# Patient Record
Sex: MALE | Race: WHITE | NOT HISPANIC OR LATINO | Employment: OTHER | URBAN - METROPOLITAN AREA
[De-identification: names, ages, dates, MRNs, and addresses within clinical notes are randomized per-mention and may not be internally consistent; named-entity substitution may affect disease eponyms.]

---

## 2021-10-02 ENCOUNTER — APPOINTMENT (EMERGENCY)
Dept: RADIOLOGY | Facility: HOSPITAL | Age: 66
End: 2021-10-02
Payer: MEDICARE

## 2021-10-02 ENCOUNTER — HOSPITAL ENCOUNTER (EMERGENCY)
Facility: HOSPITAL | Age: 66
Discharge: HOME | End: 2021-10-03
Attending: EMERGENCY MEDICINE
Payer: MEDICARE

## 2021-10-02 DIAGNOSIS — J18.9 PNEUMONIA: Primary | ICD-10-CM

## 2021-10-02 LAB
ALBUMIN SERPL BCP-MCNC: 3.7 G/DL (ref 3.5–5)
ALP SERPL-CCNC: 83 U/L (ref 46–116)
ALT SERPL W P-5'-P-CCNC: 25 U/L (ref 12–78)
ANION GAP SERPL CALCULATED.3IONS-SCNC: 9 MMOL/L (ref 4–13)
AST SERPL W P-5'-P-CCNC: 18 U/L (ref 5–45)
BACTERIA UR QL AUTO: ABNORMAL /HPF
BASOPHILS # BLD AUTO: 0.04 THOUSANDS/ΜL (ref 0–0.1)
BASOPHILS NFR BLD AUTO: 0 % (ref 0–1)
BILIRUB DIRECT SERPL-MCNC: 0.41 MG/DL (ref 0–0.2)
BILIRUB SERPL-MCNC: 1.23 MG/DL (ref 0.2–1)
BILIRUB UR QL STRIP: ABNORMAL
BUN SERPL-MCNC: 15 MG/DL (ref 5–25)
CALCIUM SERPL-MCNC: 8.9 MG/DL (ref 8.3–10.1)
CHLORIDE SERPL-SCNC: 101 MMOL/L (ref 100–108)
CLARITY UR: CLEAR
CO2 SERPL-SCNC: 29 MMOL/L (ref 21–32)
COLOR UR: ABNORMAL
CREAT SERPL-MCNC: 1.05 MG/DL (ref 0.6–1.3)
EOSINOPHIL # BLD AUTO: 0.03 THOUSAND/ΜL (ref 0–0.61)
EOSINOPHIL NFR BLD AUTO: 0 % (ref 0–6)
ERYTHROCYTE [DISTWIDTH] IN BLOOD BY AUTOMATED COUNT: 13 % (ref 11.6–15.1)
GFR SERPL CREATININE-BSD FRML MDRD: 74 ML/MIN/1.73SQ M
GLUCOSE SERPL-MCNC: 116 MG/DL (ref 65–140)
GLUCOSE UR STRIP-MCNC: NEGATIVE MG/DL
HCT VFR BLD AUTO: 46.8 % (ref 36.5–49.3)
HGB BLD-MCNC: 15.3 G/DL (ref 12–17)
HGB UR QL STRIP.AUTO: ABNORMAL
IMM GRANULOCYTES # BLD AUTO: 0.04 THOUSAND/UL (ref 0–0.2)
IMM GRANULOCYTES NFR BLD AUTO: 0 % (ref 0–2)
KETONES UR STRIP-MCNC: ABNORMAL MG/DL
LEUKOCYTE ESTERASE UR QL STRIP: NEGATIVE
LIPASE SERPL-CCNC: 62 U/L (ref 73–393)
LYMPHOCYTES # BLD AUTO: 0.96 THOUSANDS/ΜL (ref 0.6–4.47)
LYMPHOCYTES NFR BLD AUTO: 9 % (ref 14–44)
MCH RBC QN AUTO: 30.6 PG (ref 26.8–34.3)
MCHC RBC AUTO-ENTMCNC: 32.7 G/DL (ref 31.4–37.4)
MCV RBC AUTO: 94 FL (ref 82–98)
MONOCYTES # BLD AUTO: 0.94 THOUSAND/ΜL (ref 0.17–1.22)
MONOCYTES NFR BLD AUTO: 9 % (ref 4–12)
NEUTROPHILS # BLD AUTO: 8.56 THOUSANDS/ΜL (ref 1.85–7.62)
NEUTS SEG NFR BLD AUTO: 82 % (ref 43–75)
NITRITE UR QL STRIP: NEGATIVE
NON-SQ EPI CELLS URNS QL MICRO: ABNORMAL /HPF
NRBC BLD AUTO-RTO: 0 /100 WBCS
PH UR STRIP.AUTO: 5 [PH]
PLATELET # BLD AUTO: 196 THOUSANDS/UL (ref 149–390)
PMV BLD AUTO: 8.8 FL (ref 8.9–12.7)
POTASSIUM SERPL-SCNC: 4.7 MMOL/L (ref 3.5–5.3)
PROT SERPL-MCNC: 7.8 G/DL (ref 6.4–8.2)
PROT UR STRIP-MCNC: ABNORMAL MG/DL
RBC # BLD AUTO: 5 MILLION/UL (ref 3.88–5.62)
RBC #/AREA URNS AUTO: ABNORMAL /HPF
SODIUM SERPL-SCNC: 139 MMOL/L (ref 136–145)
SP GR UR STRIP.AUTO: 1.02 (ref 1–1.03)
UROBILINOGEN UR QL STRIP.AUTO: 4 E.U./DL
WBC # BLD AUTO: 10.57 THOUSAND/UL (ref 4.31–10.16)
WBC #/AREA URNS AUTO: ABNORMAL /HPF

## 2021-10-02 PROCEDURE — 74177 CT ABD & PELVIS W/CONTRAST: CPT

## 2021-10-02 PROCEDURE — 83690 ASSAY OF LIPASE: CPT | Performed by: EMERGENCY MEDICINE

## 2021-10-02 PROCEDURE — 96375 TX/PRO/DX INJ NEW DRUG ADDON: CPT

## 2021-10-02 PROCEDURE — 82248 BILIRUBIN DIRECT: CPT | Performed by: EMERGENCY MEDICINE

## 2021-10-02 PROCEDURE — 96374 THER/PROPH/DIAG INJ IV PUSH: CPT

## 2021-10-02 PROCEDURE — 36415 COLL VENOUS BLD VENIPUNCTURE: CPT | Performed by: EMERGENCY MEDICINE

## 2021-10-02 PROCEDURE — 96361 HYDRATE IV INFUSION ADD-ON: CPT

## 2021-10-02 PROCEDURE — 80053 COMPREHEN METABOLIC PANEL: CPT | Performed by: EMERGENCY MEDICINE

## 2021-10-02 PROCEDURE — 99285 EMERGENCY DEPT VISIT HI MDM: CPT | Performed by: EMERGENCY MEDICINE

## 2021-10-02 PROCEDURE — 85025 COMPLETE CBC W/AUTO DIFF WBC: CPT | Performed by: EMERGENCY MEDICINE

## 2021-10-02 PROCEDURE — 99284 EMERGENCY DEPT VISIT MOD MDM: CPT

## 2021-10-02 PROCEDURE — 81001 URINALYSIS AUTO W/SCOPE: CPT | Performed by: EMERGENCY MEDICINE

## 2021-10-02 RX ORDER — AMOXICILLIN 500 MG/1
1000 CAPSULE ORAL EVERY 8 HOURS SCHEDULED
Qty: 42 CAPSULE | Refills: 0 | Status: SHIPPED | OUTPATIENT
Start: 2021-10-02 | End: 2021-10-09

## 2021-10-02 RX ORDER — ONDANSETRON 2 MG/ML
4 INJECTION INTRAMUSCULAR; INTRAVENOUS ONCE
Status: COMPLETED | OUTPATIENT
Start: 2021-10-02 | End: 2021-10-02

## 2021-10-02 RX ORDER — HYDROMORPHONE HCL/PF 1 MG/ML
1 SYRINGE (ML) INJECTION ONCE
Status: COMPLETED | OUTPATIENT
Start: 2021-10-02 | End: 2021-10-02

## 2021-10-02 RX ORDER — AMOXICILLIN 250 MG/1
1000 CAPSULE ORAL ONCE
Status: COMPLETED | OUTPATIENT
Start: 2021-10-03 | End: 2021-10-03

## 2021-10-02 RX ADMIN — SODIUM CHLORIDE 1000 ML: 0.9 INJECTION, SOLUTION INTRAVENOUS at 21:22

## 2021-10-02 RX ADMIN — ONDANSETRON 4 MG: 2 INJECTION INTRAMUSCULAR; INTRAVENOUS at 21:26

## 2021-10-02 RX ADMIN — HYDROMORPHONE HYDROCHLORIDE 1 MG: 1 INJECTION, SOLUTION INTRAMUSCULAR; INTRAVENOUS; SUBCUTANEOUS at 21:26

## 2021-10-02 RX ADMIN — IOHEXOL 100 ML: 350 INJECTION, SOLUTION INTRAVENOUS at 22:15

## 2021-10-03 VITALS
WEIGHT: 246 LBS | TEMPERATURE: 99.9 F | RESPIRATION RATE: 20 BRPM | DIASTOLIC BLOOD PRESSURE: 85 MMHG | SYSTOLIC BLOOD PRESSURE: 148 MMHG | OXYGEN SATURATION: 96 % | HEART RATE: 96 BPM

## 2021-10-03 RX ADMIN — AMOXICILLIN 1000 MG: 250 CAPSULE ORAL at 00:01

## 2021-11-15 ENCOUNTER — APPOINTMENT (EMERGENCY)
Dept: RADIOLOGY | Facility: HOSPITAL | Age: 66
DRG: 299 | End: 2021-11-15
Payer: MEDICARE

## 2021-11-15 ENCOUNTER — OFFICE VISIT (OUTPATIENT)
Dept: URGENT CARE | Facility: CLINIC | Age: 66
End: 2021-11-15
Payer: MEDICARE

## 2021-11-15 ENCOUNTER — HOSPITAL ENCOUNTER (INPATIENT)
Facility: HOSPITAL | Age: 66
LOS: 1 days | Discharge: HOME | DRG: 299 | End: 2021-11-16
Attending: EMERGENCY MEDICINE | Admitting: INTERNAL MEDICINE
Payer: MEDICARE

## 2021-11-15 VITALS
HEIGHT: 71 IN | DIASTOLIC BLOOD PRESSURE: 90 MMHG | HEART RATE: 79 BPM | RESPIRATION RATE: 18 BRPM | SYSTOLIC BLOOD PRESSURE: 150 MMHG | WEIGHT: 258.6 LBS | TEMPERATURE: 98.2 F | OXYGEN SATURATION: 99 % | BODY MASS INDEX: 36.2 KG/M2

## 2021-11-15 DIAGNOSIS — M79.604 RIGHT LEG PAIN: ICD-10-CM

## 2021-11-15 DIAGNOSIS — I26.99 PULMONARY EMBOLISM (HCC): Primary | ICD-10-CM

## 2021-11-15 DIAGNOSIS — I82.401 ACUTE DEEP VEIN THROMBOSIS (DVT) OF RIGHT LOWER EXTREMITY (HCC): ICD-10-CM

## 2021-11-15 DIAGNOSIS — M79.604 PAIN AND SWELLING OF RIGHT LOWER EXTREMITY: Primary | ICD-10-CM

## 2021-11-15 DIAGNOSIS — M79.89 PAIN AND SWELLING OF RIGHT LOWER EXTREMITY: Primary | ICD-10-CM

## 2021-11-15 PROBLEM — J18.1 LEFT LOWER LOBE CONSOLIDATION (HCC): Status: ACTIVE | Noted: 2021-11-15

## 2021-11-15 PROBLEM — I82.461 ACUTE DEEP VEIN THROMBOSIS (DVT) OF CALF MUSCLE VEIN OF RIGHT LOWER EXTREMITY (HCC): Status: ACTIVE | Noted: 2021-11-15

## 2021-11-15 PROBLEM — R59.1 LYMPHADENOPATHY: Status: ACTIVE | Noted: 2021-11-15

## 2021-11-15 LAB
2HR DELTA HS TROPONIN: 1 NG/L
4HR DELTA HS TROPONIN: 0 NG/L
ANION GAP SERPL CALCULATED.3IONS-SCNC: 8 MMOL/L (ref 4–13)
APTT PPP: 27 SECONDS (ref 23–37)
BACTERIA UR QL AUTO: NORMAL /HPF
BASOPHILS # BLD AUTO: 0.07 THOUSANDS/ΜL (ref 0–0.1)
BASOPHILS NFR BLD AUTO: 1 % (ref 0–1)
BILIRUB UR QL STRIP: NEGATIVE
BUN SERPL-MCNC: 10 MG/DL (ref 5–25)
CALCIUM SERPL-MCNC: 8.5 MG/DL (ref 8.3–10.1)
CARDIAC TROPONIN I PNL SERPL HS: 2 NG/L
CARDIAC TROPONIN I PNL SERPL HS: 2 NG/L
CARDIAC TROPONIN I PNL SERPL HS: 3 NG/L
CHLORIDE SERPL-SCNC: 106 MMOL/L (ref 100–108)
CLARITY UR: CLEAR
CO2 SERPL-SCNC: 28 MMOL/L (ref 21–32)
COLOR UR: ABNORMAL
CREAT SERPL-MCNC: 0.83 MG/DL (ref 0.6–1.3)
EOSINOPHIL # BLD AUTO: 0.57 THOUSAND/ΜL (ref 0–0.61)
EOSINOPHIL NFR BLD AUTO: 8 % (ref 0–6)
ERYTHROCYTE [DISTWIDTH] IN BLOOD BY AUTOMATED COUNT: 12.8 % (ref 11.6–15.1)
FLUAV RNA RESP QL NAA+PROBE: NEGATIVE
FLUBV RNA RESP QL NAA+PROBE: NEGATIVE
GFR SERPL CREATININE-BSD FRML MDRD: 92 ML/MIN/1.73SQ M
GLUCOSE SERPL-MCNC: 90 MG/DL (ref 65–140)
GLUCOSE UR STRIP-MCNC: NEGATIVE MG/DL
HCT VFR BLD AUTO: 43.6 % (ref 36.5–49.3)
HGB BLD-MCNC: 14.4 G/DL (ref 12–17)
HGB UR QL STRIP.AUTO: ABNORMAL
IMM GRANULOCYTES # BLD AUTO: 0.02 THOUSAND/UL (ref 0–0.2)
IMM GRANULOCYTES NFR BLD AUTO: 0 % (ref 0–2)
INR PPP: 1.04 (ref 0.84–1.19)
KETONES UR STRIP-MCNC: NEGATIVE MG/DL
LEUKOCYTE ESTERASE UR QL STRIP: NEGATIVE
LYMPHOCYTES # BLD AUTO: 1.37 THOUSANDS/ΜL (ref 0.6–4.47)
LYMPHOCYTES NFR BLD AUTO: 20 % (ref 14–44)
MAGNESIUM SERPL-MCNC: 2.2 MG/DL (ref 1.6–2.6)
MCH RBC QN AUTO: 30.7 PG (ref 26.8–34.3)
MCHC RBC AUTO-ENTMCNC: 33 G/DL (ref 31.4–37.4)
MCV RBC AUTO: 93 FL (ref 82–98)
MONOCYTES # BLD AUTO: 0.44 THOUSAND/ΜL (ref 0.17–1.22)
MONOCYTES NFR BLD AUTO: 6 % (ref 4–12)
NEUTROPHILS # BLD AUTO: 4.49 THOUSANDS/ΜL (ref 1.85–7.62)
NEUTS SEG NFR BLD AUTO: 65 % (ref 43–75)
NITRITE UR QL STRIP: NEGATIVE
NON-SQ EPI CELLS URNS QL MICRO: NORMAL /HPF
NRBC BLD AUTO-RTO: 0 /100 WBCS
PH UR STRIP.AUTO: 5 [PH]
PLATELET # BLD AUTO: 205 THOUSANDS/UL (ref 149–390)
PMV BLD AUTO: 8.7 FL (ref 8.9–12.7)
POTASSIUM SERPL-SCNC: 3.9 MMOL/L (ref 3.5–5.3)
PROT UR STRIP-MCNC: NEGATIVE MG/DL
PROTHROMBIN TIME: 13.4 SECONDS (ref 11.6–14.5)
RBC # BLD AUTO: 4.69 MILLION/UL (ref 3.88–5.62)
RBC #/AREA URNS AUTO: NORMAL /HPF
RSV RNA RESP QL NAA+PROBE: NEGATIVE
SARS-COV-2 RNA RESP QL NAA+PROBE: NEGATIVE
SODIUM SERPL-SCNC: 142 MMOL/L (ref 136–145)
SP GR UR STRIP.AUTO: 1.01 (ref 1–1.03)
UROBILINOGEN UR QL STRIP.AUTO: 0.2 E.U./DL
WBC # BLD AUTO: 6.96 THOUSAND/UL (ref 4.31–10.16)
WBC #/AREA URNS AUTO: NORMAL /HPF

## 2021-11-15 PROCEDURE — 99285 EMERGENCY DEPT VISIT HI MDM: CPT | Performed by: EMERGENCY MEDICINE

## 2021-11-15 PROCEDURE — 80048 BASIC METABOLIC PNL TOTAL CA: CPT | Performed by: EMERGENCY MEDICINE

## 2021-11-15 PROCEDURE — 36415 COLL VENOUS BLD VENIPUNCTURE: CPT | Performed by: EMERGENCY MEDICINE

## 2021-11-15 PROCEDURE — 83735 ASSAY OF MAGNESIUM: CPT | Performed by: EMERGENCY MEDICINE

## 2021-11-15 PROCEDURE — 99285 EMERGENCY DEPT VISIT HI MDM: CPT

## 2021-11-15 PROCEDURE — 85730 THROMBOPLASTIN TIME PARTIAL: CPT | Performed by: EMERGENCY MEDICINE

## 2021-11-15 PROCEDURE — 71275 CT ANGIOGRAPHY CHEST: CPT

## 2021-11-15 PROCEDURE — 93971 EXTREMITY STUDY: CPT | Performed by: SURGERY

## 2021-11-15 PROCEDURE — 0241U HB NFCT DS VIR RESP RNA 4 TRGT: CPT | Performed by: NURSE PRACTITIONER

## 2021-11-15 PROCEDURE — 93005 ELECTROCARDIOGRAM TRACING: CPT

## 2021-11-15 PROCEDURE — 99223 1ST HOSP IP/OBS HIGH 75: CPT | Performed by: INTERNAL MEDICINE

## 2021-11-15 PROCEDURE — 84484 ASSAY OF TROPONIN QUANT: CPT | Performed by: EMERGENCY MEDICINE

## 2021-11-15 PROCEDURE — 93971 EXTREMITY STUDY: CPT

## 2021-11-15 PROCEDURE — 84484 ASSAY OF TROPONIN QUANT: CPT | Performed by: NURSE PRACTITIONER

## 2021-11-15 PROCEDURE — 73590 X-RAY EXAM OF LOWER LEG: CPT

## 2021-11-15 PROCEDURE — 1124F ACP DISCUSS-NO DSCNMKR DOCD: CPT | Performed by: EMERGENCY MEDICINE

## 2021-11-15 PROCEDURE — 85025 COMPLETE CBC W/AUTO DIFF WBC: CPT | Performed by: EMERGENCY MEDICINE

## 2021-11-15 PROCEDURE — 99213 OFFICE O/P EST LOW 20 MIN: CPT | Performed by: PHYSICIAN ASSISTANT

## 2021-11-15 PROCEDURE — 81001 URINALYSIS AUTO W/SCOPE: CPT | Performed by: EMERGENCY MEDICINE

## 2021-11-15 PROCEDURE — G1004 CDSM NDSC: HCPCS

## 2021-11-15 PROCEDURE — 85610 PROTHROMBIN TIME: CPT | Performed by: EMERGENCY MEDICINE

## 2021-11-15 RX ORDER — POLYETHYLENE GLYCOL 3350 17 G/17G
17 POWDER, FOR SOLUTION ORAL DAILY PRN
Status: DISCONTINUED | OUTPATIENT
Start: 2021-11-15 | End: 2021-11-16 | Stop reason: HOSPADM

## 2021-11-15 RX ORDER — ACETAMINOPHEN 325 MG/1
650 TABLET ORAL EVERY 6 HOURS PRN
Status: DISCONTINUED | OUTPATIENT
Start: 2021-11-15 | End: 2021-11-16 | Stop reason: HOSPADM

## 2021-11-15 RX ORDER — CALCIUM CARBONATE 200(500)MG
1000 TABLET,CHEWABLE ORAL DAILY PRN
Status: DISCONTINUED | OUTPATIENT
Start: 2021-11-15 | End: 2021-11-16 | Stop reason: HOSPADM

## 2021-11-15 RX ORDER — ONDANSETRON 2 MG/ML
4 INJECTION INTRAMUSCULAR; INTRAVENOUS EVERY 6 HOURS PRN
Status: DISCONTINUED | OUTPATIENT
Start: 2021-11-15 | End: 2021-11-16 | Stop reason: HOSPADM

## 2021-11-15 RX ADMIN — IOHEXOL 85 ML: 350 INJECTION, SOLUTION INTRAVENOUS at 16:48

## 2021-11-15 RX ADMIN — ENOXAPARIN SODIUM 120 MG: 120 INJECTION SUBCUTANEOUS at 17:44

## 2021-11-16 ENCOUNTER — APPOINTMENT (INPATIENT)
Dept: NON INVASIVE DIAGNOSTICS | Facility: HOSPITAL | Age: 66
DRG: 299 | End: 2021-11-16
Payer: MEDICARE

## 2021-11-16 LAB
ANION GAP SERPL CALCULATED.3IONS-SCNC: 6 MMOL/L (ref 4–13)
AORTIC ROOT: 3.5 CM
APICAL FOUR CHAMBER EJECTION FRACTION: 56 %
ATRIAL RATE: 64 BPM
AV LVOT PEAK GRADIENT: 4 MMHG
AV PEAK GRADIENT: 7 MMHG
BUN SERPL-MCNC: 9 MG/DL (ref 5–25)
CALCIUM SERPL-MCNC: 8.2 MG/DL (ref 8.3–10.1)
CHLORIDE SERPL-SCNC: 106 MMOL/L (ref 100–108)
CO2 SERPL-SCNC: 28 MMOL/L (ref 21–32)
CREAT SERPL-MCNC: 0.85 MG/DL (ref 0.6–1.3)
DOP CALC LVOT AREA: 3.14 CM2
DOP CALC LVOT DIAMETER: 2 CM
E WAVE DECELERATION TIME: 167 MS
ERYTHROCYTE [DISTWIDTH] IN BLOOD BY AUTOMATED COUNT: 12.7 % (ref 11.6–15.1)
FRACTIONAL SHORTENING: 31 % (ref 28–44)
GFR SERPL CREATININE-BSD FRML MDRD: 91 ML/MIN/1.73SQ M
GLUCOSE SERPL-MCNC: 87 MG/DL (ref 65–140)
HCT VFR BLD AUTO: 42.1 % (ref 36.5–49.3)
HGB BLD-MCNC: 13.5 G/DL (ref 12–17)
INTERVENTRICULAR SEPTUM IN DIASTOLE (PARASTERNAL SHORT AXIS VIEW): 1.2 CM
LEFT ATRIUM AREA SYSTOLE SINGLE PLANE A4C: 18.7 CM2
LEFT INTERNAL DIMENSION IN SYSTOLE: 3.1 CM (ref 2.1–4)
LEFT VENTRICULAR INTERNAL DIMENSION IN DIASTOLE: 4.5 CM (ref 9.82–14.64)
LEFT VENTRICULAR POSTERIOR WALL IN END DIASTOLE: 1.2 CM
LEFT VENTRICULAR STROKE VOLUME: 56 ML
MAGNESIUM SERPL-MCNC: 2.3 MG/DL (ref 1.6–2.6)
MCH RBC QN AUTO: 30.1 PG (ref 26.8–34.3)
MCHC RBC AUTO-ENTMCNC: 32.1 G/DL (ref 31.4–37.4)
MCV RBC AUTO: 94 FL (ref 82–98)
MV E'TISSUE VEL-LAT: 13 CM/S
MV E'TISSUE VEL-SEP: 11 CM/S
MV PEAK A VEL: 0.9 M/S
MV PEAK E VEL: 92 CM/S
MV STENOSIS PRESSURE HALF TIME: 0 MS
P AXIS: 52 DEGREES
PLATELET # BLD AUTO: 186 THOUSANDS/UL (ref 149–390)
PMV BLD AUTO: 8.5 FL (ref 8.9–12.7)
POTASSIUM SERPL-SCNC: 4.1 MMOL/L (ref 3.5–5.3)
PR INTERVAL: 136 MS
PROCALCITONIN SERPL-MCNC: <0.05 NG/ML
PV PEAK GRADIENT: 4 MMHG
QRS AXIS: 45 DEGREES
QRSD INTERVAL: 80 MS
QT INTERVAL: 402 MS
QTC INTERVAL: 414 MS
RBC # BLD AUTO: 4.48 MILLION/UL (ref 3.88–5.62)
RIGHT ATRIUM AREA SYSTOLE A4C: 19.1 CM2
RIGHT VENTRICLE ID DIMENSION: 3.6 CM
SL CV PED ECHO LEFT VENTRICLE DIASTOLIC VOLUME (MOD BIPLANE) 2D: 93 ML
SL CV PED ECHO LEFT VENTRICLE SYSTOLIC VOLUME (MOD BIPLANE) 2D: 37 ML
SODIUM SERPL-SCNC: 140 MMOL/L (ref 136–145)
T WAVE AXIS: 44 DEGREES
TRICUSPID VALVE S': 0.9 CM/S
VENTRICULAR RATE: 64 BPM
WBC # BLD AUTO: 5.47 THOUSAND/UL (ref 4.31–10.16)
Z-SCORE OF LEFT VENTRICULAR DIMENSION IN END SYSTOLE: -9.8

## 2021-11-16 PROCEDURE — 99239 HOSP IP/OBS DSCHRG MGMT >30: CPT | Performed by: NURSE PRACTITIONER

## 2021-11-16 PROCEDURE — 93306 TTE W/DOPPLER COMPLETE: CPT

## 2021-11-16 PROCEDURE — 80048 BASIC METABOLIC PNL TOTAL CA: CPT | Performed by: NURSE PRACTITIONER

## 2021-11-16 PROCEDURE — 93306 TTE W/DOPPLER COMPLETE: CPT | Performed by: INTERNAL MEDICINE

## 2021-11-16 PROCEDURE — 85027 COMPLETE CBC AUTOMATED: CPT | Performed by: NURSE PRACTITIONER

## 2021-11-16 PROCEDURE — 84145 PROCALCITONIN (PCT): CPT | Performed by: NURSE PRACTITIONER

## 2021-11-16 PROCEDURE — 83735 ASSAY OF MAGNESIUM: CPT | Performed by: NURSE PRACTITIONER

## 2021-11-16 PROCEDURE — 93010 ELECTROCARDIOGRAM REPORT: CPT | Performed by: INTERNAL MEDICINE

## 2021-11-16 RX ADMIN — ENOXAPARIN SODIUM 120 MG: 120 INJECTION SUBCUTANEOUS at 06:53

## 2021-11-17 ENCOUNTER — TELEPHONE (OUTPATIENT)
Dept: HEMATOLOGY ONCOLOGY | Facility: CLINIC | Age: 66
End: 2021-11-17

## 2021-11-20 VITALS
HEIGHT: 70 IN | DIASTOLIC BLOOD PRESSURE: 74 MMHG | OXYGEN SATURATION: 98 % | SYSTOLIC BLOOD PRESSURE: 123 MMHG | WEIGHT: 257 LBS | BODY MASS INDEX: 36.79 KG/M2 | TEMPERATURE: 97.4 F | HEART RATE: 66 BPM | RESPIRATION RATE: 18 BRPM

## 2021-12-01 ENCOUNTER — OFFICE VISIT (OUTPATIENT)
Dept: FAMILY MEDICINE CLINIC | Facility: CLINIC | Age: 66
End: 2021-12-01
Payer: MEDICARE

## 2021-12-01 VITALS
TEMPERATURE: 97 F | SYSTOLIC BLOOD PRESSURE: 122 MMHG | BODY MASS INDEX: 38.65 KG/M2 | DIASTOLIC BLOOD PRESSURE: 64 MMHG | WEIGHT: 255 LBS | HEIGHT: 68 IN | RESPIRATION RATE: 16 BRPM | HEART RATE: 60 BPM

## 2021-12-01 DIAGNOSIS — E66.09 CLASS 2 OBESITY DUE TO EXCESS CALORIES WITHOUT SERIOUS COMORBIDITY WITH BODY MASS INDEX (BMI) OF 38.0 TO 38.9 IN ADULT: ICD-10-CM

## 2021-12-01 DIAGNOSIS — Z13.6 SCREENING FOR CARDIOVASCULAR CONDITION: ICD-10-CM

## 2021-12-01 DIAGNOSIS — I82.401 ACUTE DEEP VEIN THROMBOSIS (DVT) OF RIGHT LOWER EXTREMITY (HCC): ICD-10-CM

## 2021-12-01 DIAGNOSIS — J18.1 LEFT LOWER LOBE CONSOLIDATION (HCC): ICD-10-CM

## 2021-12-01 DIAGNOSIS — R59.1 LYMPHADENOPATHY: ICD-10-CM

## 2021-12-01 DIAGNOSIS — I82.461 ACUTE DEEP VEIN THROMBOSIS (DVT) OF CALF MUSCLE VEIN OF RIGHT LOWER EXTREMITY (HCC): Primary | ICD-10-CM

## 2021-12-01 DIAGNOSIS — Z12.5 SCREENING PSA (PROSTATE SPECIFIC ANTIGEN): ICD-10-CM

## 2021-12-01 DIAGNOSIS — I26.99 ACUTE PULMONARY EMBOLISM WITHOUT ACUTE COR PULMONALE, UNSPECIFIED PULMONARY EMBOLISM TYPE (HCC): ICD-10-CM

## 2021-12-01 DIAGNOSIS — I26.99 PULMONARY EMBOLISM (HCC): ICD-10-CM

## 2021-12-01 PROBLEM — E66.812 CLASS 2 OBESITY DUE TO EXCESS CALORIES WITHOUT SERIOUS COMORBIDITY WITH BODY MASS INDEX (BMI) OF 38.0 TO 38.9 IN ADULT: Status: ACTIVE | Noted: 2021-12-01

## 2021-12-01 PROCEDURE — 99214 OFFICE O/P EST MOD 30 MIN: CPT | Performed by: NURSE PRACTITIONER

## 2021-12-03 ENCOUNTER — APPOINTMENT (OUTPATIENT)
Dept: LAB | Facility: CLINIC | Age: 66
End: 2021-12-03
Payer: MEDICARE

## 2021-12-03 DIAGNOSIS — I26.99 PULMONARY EMBOLISM (HCC): ICD-10-CM

## 2021-12-03 DIAGNOSIS — I82.401 ACUTE DEEP VEIN THROMBOSIS (DVT) OF RIGHT LOWER EXTREMITY (HCC): ICD-10-CM

## 2021-12-03 LAB
ERYTHROCYTE [DISTWIDTH] IN BLOOD BY AUTOMATED COUNT: 13.4 % (ref 11.6–15.1)
HCT VFR BLD AUTO: 44.4 % (ref 36.5–49.3)
HGB BLD-MCNC: 14.6 G/DL (ref 12–17)
MCH RBC QN AUTO: 30.6 PG (ref 26.8–34.3)
MCHC RBC AUTO-ENTMCNC: 32.9 G/DL (ref 31.4–37.4)
MCV RBC AUTO: 93 FL (ref 82–98)
PLATELET # BLD AUTO: 225 THOUSANDS/UL (ref 149–390)
PMV BLD AUTO: 9.5 FL (ref 8.9–12.7)
RBC # BLD AUTO: 4.77 MILLION/UL (ref 3.88–5.62)
WBC # BLD AUTO: 6.77 THOUSAND/UL (ref 4.31–10.16)

## 2021-12-03 PROCEDURE — 36415 COLL VENOUS BLD VENIPUNCTURE: CPT

## 2021-12-03 PROCEDURE — 85027 COMPLETE CBC AUTOMATED: CPT

## 2021-12-16 ENCOUNTER — OFFICE VISIT (OUTPATIENT)
Dept: HEMATOLOGY ONCOLOGY | Facility: MEDICAL CENTER | Age: 66
End: 2021-12-16
Payer: MEDICARE

## 2021-12-16 VITALS
WEIGHT: 253 LBS | BODY MASS INDEX: 38.34 KG/M2 | SYSTOLIC BLOOD PRESSURE: 124 MMHG | OXYGEN SATURATION: 97 % | DIASTOLIC BLOOD PRESSURE: 72 MMHG | HEIGHT: 68 IN | TEMPERATURE: 97.4 F | RESPIRATION RATE: 16 BRPM | HEART RATE: 71 BPM

## 2021-12-16 DIAGNOSIS — I82.461 ACUTE DEEP VEIN THROMBOSIS (DVT) OF CALF MUSCLE VEIN OF RIGHT LOWER EXTREMITY (HCC): ICD-10-CM

## 2021-12-16 DIAGNOSIS — R59.1 LYMPHADENOPATHY: ICD-10-CM

## 2021-12-16 DIAGNOSIS — I26.99 ACUTE PULMONARY EMBOLISM WITHOUT ACUTE COR PULMONALE, UNSPECIFIED PULMONARY EMBOLISM TYPE (HCC): Primary | ICD-10-CM

## 2021-12-16 PROCEDURE — 99215 OFFICE O/P EST HI 40 MIN: CPT | Performed by: INTERNAL MEDICINE

## 2022-01-28 DIAGNOSIS — I26.99 PULMONARY EMBOLISM (HCC): ICD-10-CM

## 2022-01-28 DIAGNOSIS — I82.401 ACUTE DEEP VEIN THROMBOSIS (DVT) OF RIGHT LOWER EXTREMITY (HCC): ICD-10-CM

## 2022-01-31 RX ORDER — APIXABAN 5 MG/1
TABLET, FILM COATED ORAL
Qty: 60 TABLET | Refills: 1 | Status: SHIPPED | OUTPATIENT
Start: 2022-01-31 | End: 2022-03-02

## 2022-02-16 ENCOUNTER — APPOINTMENT (OUTPATIENT)
Dept: LAB | Facility: CLINIC | Age: 67
End: 2022-02-16
Payer: MEDICARE

## 2022-02-16 DIAGNOSIS — Z12.5 SCREENING PSA (PROSTATE SPECIFIC ANTIGEN): ICD-10-CM

## 2022-02-16 DIAGNOSIS — Z13.6 SCREENING FOR CARDIOVASCULAR CONDITION: ICD-10-CM

## 2022-02-16 LAB
ALBUMIN SERPL BCP-MCNC: 3.8 G/DL (ref 3.5–5)
ALP SERPL-CCNC: 76 U/L (ref 46–116)
ALT SERPL W P-5'-P-CCNC: 24 U/L (ref 12–78)
ANION GAP SERPL CALCULATED.3IONS-SCNC: 5 MMOL/L (ref 4–13)
AST SERPL W P-5'-P-CCNC: 15 U/L (ref 5–45)
BILIRUB SERPL-MCNC: 0.43 MG/DL (ref 0.2–1)
BUN SERPL-MCNC: 19 MG/DL (ref 5–25)
CALCIUM SERPL-MCNC: 9 MG/DL (ref 8.3–10.1)
CHLORIDE SERPL-SCNC: 110 MMOL/L (ref 100–108)
CHOLEST SERPL-MCNC: 231 MG/DL
CO2 SERPL-SCNC: 26 MMOL/L (ref 21–32)
CREAT SERPL-MCNC: 0.99 MG/DL (ref 0.6–1.3)
GFR SERPL CREATININE-BSD FRML MDRD: 79 ML/MIN/1.73SQ M
GLUCOSE P FAST SERPL-MCNC: 90 MG/DL (ref 65–99)
HDLC SERPL-MCNC: 54 MG/DL
LDLC SERPL CALC-MCNC: 159 MG/DL (ref 0–100)
NONHDLC SERPL-MCNC: 177 MG/DL
POTASSIUM SERPL-SCNC: 4.3 MMOL/L (ref 3.5–5.3)
PROT SERPL-MCNC: 7.3 G/DL (ref 6.4–8.2)
PSA SERPL-MCNC: 2 NG/ML (ref 0–4)
SODIUM SERPL-SCNC: 141 MMOL/L (ref 136–145)
TRIGL SERPL-MCNC: 90 MG/DL

## 2022-02-16 PROCEDURE — 36415 COLL VENOUS BLD VENIPUNCTURE: CPT

## 2022-02-16 PROCEDURE — 80061 LIPID PANEL: CPT

## 2022-02-16 PROCEDURE — G0103 PSA SCREENING: HCPCS

## 2022-02-16 PROCEDURE — 80053 COMPREHEN METABOLIC PANEL: CPT

## 2022-02-17 DIAGNOSIS — I26.99 ACUTE PULMONARY EMBOLISM WITHOUT ACUTE COR PULMONALE, UNSPECIFIED PULMONARY EMBOLISM TYPE (HCC): Primary | ICD-10-CM

## 2022-02-17 NOTE — TELEPHONE ENCOUNTER
Patient needs a refill of Eliquis  I confirmed pharmacy on file is correct  Please call patient once sent   [phone]

## 2022-03-02 ENCOUNTER — OFFICE VISIT (OUTPATIENT)
Dept: FAMILY MEDICINE CLINIC | Facility: CLINIC | Age: 67
End: 2022-03-02
Payer: MEDICARE

## 2022-03-02 VITALS
BODY MASS INDEX: 38.49 KG/M2 | OXYGEN SATURATION: 99 % | RESPIRATION RATE: 16 BRPM | WEIGHT: 254 LBS | HEIGHT: 68 IN | DIASTOLIC BLOOD PRESSURE: 76 MMHG | TEMPERATURE: 97.5 F | HEART RATE: 75 BPM | SYSTOLIC BLOOD PRESSURE: 124 MMHG

## 2022-03-02 DIAGNOSIS — Z12.11 COLON CANCER SCREENING: ICD-10-CM

## 2022-03-02 DIAGNOSIS — E66.01 OBESITY, MORBID (HCC): ICD-10-CM

## 2022-03-02 DIAGNOSIS — I26.99 ACUTE PULMONARY EMBOLISM WITHOUT ACUTE COR PULMONALE, UNSPECIFIED PULMONARY EMBOLISM TYPE (HCC): ICD-10-CM

## 2022-03-02 DIAGNOSIS — Z00.00 MEDICARE ANNUAL WELLNESS VISIT, SUBSEQUENT: Primary | ICD-10-CM

## 2022-03-02 DIAGNOSIS — Z80.0 FAMILY HISTORY OF COLON CANCER: ICD-10-CM

## 2022-03-02 PROCEDURE — G0438 PPPS, INITIAL VISIT: HCPCS | Performed by: INTERNAL MEDICINE

## 2022-03-02 NOTE — PATIENT INSTRUCTIONS

## 2022-03-02 NOTE — PROGRESS NOTES
Assessment and Plan:     Problem List Items Addressed This Visit     None           Preventive health issues were discussed with patient, and age appropriate screening tests were ordered as noted in patient's After Visit Summary  Personalized health advice and appropriate referrals for health education or preventive services given if needed, as noted in patient's After Visit Summary  History of Present Illness:     Patient presents for Medicare Annual Wellness visit    Patient Care Team:  Raghavendra Uribe MD as PCP - General (Internal Medicine)     Problem List:     Patient Active Problem List   Diagnosis    Pulmonary embolism [facility] [address] [facility]    Acute deep vein thrombosis (DVT) of calf muscle vein of right lower extremity ([facility] [address] )    Left lower lobe consolidation ([facility] [address] )    Lymphadenopathy    Class 2 obesity due to excess calories without serious comorbidity with body mass index (BMI) of 38 0 to 38 9 in adult      Past Medical and Surgical History:     Past Medical History:   Diagnosis Date    Pneumonia      Past Surgical History:   Procedure Laterality Date    EYE SURGERY Bilateral     cataracts with IOL    ORTHOPEDIC SURGERY Left     4 th finger - tendon reattached at age 26      Family History:     No family history on file     Social History:     Social History     Socioeconomic History    Marital status: /Civil Union     Spouse name: None    Number of children: None    Years of education: None    Highest education level: None   Occupational History    None   Tobacco Use    Smoking status: Never Smoker    Smokeless tobacco: Never Used   Vaping Use    Vaping Use: Never used   Substance and Sexual Activity    Alcohol use: Not Currently    Drug use: Never    Sexual activity: None   Other Topics Concern    None   Social History Narrative    None     Social Determinants of Health     Financial Resource Strain: Not on file   Food Insecurity: Not on file   Transportation Needs: Not on file   Physical Activity: Not on file   Stress: Not on file   Social Connections: Not on file   Intimate Partner Violence: Not on file   Housing Stability: Not on file      Medications and Allergies:     Current Outpatient Medications   Medication Sig Dispense Refill    apixaban (Eliquis) 5 mg Take 1 tablet (5 mg total) by mouth 2 (two) times a day 60 tablet 2    Eliquis 5 MG TAKE 2 TABLETS TWICE DAILY FOR 7 DAYS THEN TAKE 1 TABLET TWICE DAILY (Patient not taking: Reported on 3/2/2022) 60 tablet 1     No current facility-administered medications for this visit  No Known Allergies   Immunizations: There is no immunization history on file for this patient  Health Maintenance:         Topic Date Due    Hepatitis C Screening  Never done    Colorectal Cancer Screening  Never done         Topic Date Due    COVID-19 Vaccine (1) Never done    DTaP,Tdap,and Td Vaccines (1 - Tdap) Never done    Pneumococcal Vaccine: 65+ Years (1 of 1 - PPSV23) Never done      Medicare Health Risk Assessment:     /76   Pulse 75   Temp 97 5 °F (36 4 °C)   Resp 16   Ht 5' 8" (1 727 m)   Wt 115 kg (254 lb)   SpO2 99%   BMI 38 62 kg/m²      Bessie Godwin is here for his Initial Wellness visit  Health Risk Assessment:   Patient rates overall health as good  Patient feels that their physical health rating is slightly worse  Patient is satisfied with their life  Eyesight was rated as same  Hearing was rated as same  Patient feels that their emotional and mental health rating is same  Patients states they are sometimes angry  Patient states they are sometimes unusually tired/fatigued  Pain experienced in the last 7 days has been none  Patient states that he has experienced no weight loss or gain in last 6 months  Depression Screening:   PHQ-2 Score: 0      Fall Risk Screening:    In the past year, patient has experienced: no history of falling in past year      Home Safety:  Patient does not have trouble with stairs inside or outside of their home  Patient has no working smoke alarms and has no working carbon monoxide detector  Home safety hazards include: none  Nutrition:   Current diet is Regular  Medications:   Patient is not currently taking any over-the-counter supplements  Patient is able to manage medications  Activities of Daily Living (ADLs)/Instrumental Activities of Daily Living (IADLs):   Walk and transfer into and out of bed and chair?: Yes  Dress and groom yourself?: Yes    Bathe or shower yourself?: Yes    Feed yourself? Yes  Do your laundry/housekeeping?: Yes  Manage your money, pay your bills and track your expenses?: Yes  Make your own meals?: Yes    Do your own shopping?: Yes    Previous Hospitalizations:   Any hospitalizations or ED visits within the last 12 months?: Yes    How many hospitalizations have you had in the last year?: 1-2    Advance Care Planning:   Living will: No      Comments: Declines information  Cognitive Screening:   Provider or family/friend/caregiver concerned regarding cognition?: No    PREVENTIVE SCREENINGS      Cardiovascular Screening:    General: Screening Current      Diabetes Screening:     General: Screening Current      Colorectal Cancer Screening:     General: Risks and Benefits Discussed    Due for: Colonoscopy - High Risk      Prostate Cancer Screening:    General: Screening Current      Osteoporosis Screening:    General: Screening Not Indicated      Abdominal Aortic Aneurysm (AAA) Screening:    Risk factors include: age between 73-67 yo        General: Screening Not Indicated      Lung Cancer Screening:     General: Screening Not Indicated      Hepatitis C Screening:    General: Risks and Benefits Discussed    Screening, Brief Intervention, and Referral to Treatment (SBIRT)    Screening  Typical number of drinks in a day: 0  Typical number of drinks in a week: 0  Interpretation: Low risk drinking behavior      AUDIT-C Screenin) How often did you have a drink containing alcohol in the past year? never  2) How many drinks did you have on a typical day when you were drinking in the past year? 0  3) How often did you have 6 or more drinks on one occasion in the past year? never    AUDIT-C Score: 0  Interpretation: Score 0-3 (male): Negative screen for alcohol misuse    Single Item Drug Screening:  How often have you used an illegal drug (including marijuana) or a prescription medication for non-medical reasons in the past year? never    Single Item Drug Screen Score: 0  Interpretation: Negative screen for possible drug use disorder    Brief Intervention  Alcohol & drug use screenings were reviewed  No concerns regarding substance use disorder identified  Other Counseling Topics:   Car/seat belt/driving safety, skin self-exam, sunscreen and calcium and vitamin D intake and regular weightbearing exercise  Physical Exam  Constitutional:       General: He is not in acute distress  Appearance: He is well-developed  HENT:      Head: Normocephalic and atraumatic  Right Ear: External ear normal       Left Ear: External ear normal       Nose: Nose normal    Eyes:      Conjunctiva/sclera: Conjunctivae normal       Pupils: Pupils are equal, round, and reactive to light  Neck:      Thyroid: No thyromegaly  Vascular: No JVD  Cardiovascular:      Rate and Rhythm: Normal rate and regular rhythm  Pulses: Intact distal pulses  Heart sounds: Normal heart sounds  No murmur heard  No friction rub  No gallop  Pulmonary:      Effort: Pulmonary effort is normal       Breath sounds: Normal breath sounds  No wheezing or rales  Abdominal:      General: Bowel sounds are normal  There is no distension  Palpations: Abdomen is soft  Tenderness: There is no abdominal tenderness  Musculoskeletal:         General: Normal range of motion  Cervical back: Normal range of motion and neck supple  Skin:     General: Skin is warm and dry  Findings: No rash  Neurological:      Mental Status: He is alert and oriented to person, place, and time  Deep Tendon Reflexes: Reflexes are normal and symmetric  Psychiatric:         Behavior: Behavior normal          Thought Content:  Thought content normal          Judgment: Judgment normal          Han Harrell MD

## 2022-03-02 NOTE — PROGRESS NOTES
Assessment/Plan:    There are no diagnoses linked to this encounter  There are no Patient Instructions on file for this visit  No follow-ups on file  Subjective:      Patient ID: Amairani Villafuerte is a 78 y o  male  No chief complaint on file  HPI    The following portions of the patient's history were reviewed and updated as appropriate: allergies, current medications, past family history, past medical history, past social history, past surgical history and problem list     Review of Systems   Constitutional: Negative  Respiratory: Negative  Cardiovascular: Negative  Current Outpatient Medications   Medication Sig Dispense Refill    apixaban (Eliquis) 5 mg Take 1 tablet (5 mg total) by mouth 2 (two) times a day 60 tablet 2    Eliquis 5 MG TAKE 2 TABLETS TWICE DAILY FOR 7 DAYS THEN TAKE 1 TABLET TWICE DAILY 60 tablet 1     No current facility-administered medications for this visit  Objective: There were no vitals taken for this visit         Physical Exam           Vernon Salgado MD

## 2022-03-18 ENCOUNTER — OFFICE VISIT (OUTPATIENT)
Dept: HEMATOLOGY ONCOLOGY | Facility: MEDICAL CENTER | Age: 67
End: 2022-03-18
Payer: MEDICARE

## 2022-03-18 VITALS
DIASTOLIC BLOOD PRESSURE: 70 MMHG | OXYGEN SATURATION: 97 % | RESPIRATION RATE: 17 BRPM | SYSTOLIC BLOOD PRESSURE: 122 MMHG | HEIGHT: 68 IN | BODY MASS INDEX: 38.8 KG/M2 | TEMPERATURE: 96.2 F | WEIGHT: 256 LBS | HEART RATE: 69 BPM

## 2022-03-18 DIAGNOSIS — I26.99 ACUTE PULMONARY EMBOLISM WITHOUT ACUTE COR PULMONALE, UNSPECIFIED PULMONARY EMBOLISM TYPE (HCC): Primary | ICD-10-CM

## 2022-03-18 DIAGNOSIS — I82.461 ACUTE DEEP VEIN THROMBOSIS (DVT) OF CALF MUSCLE VEIN OF RIGHT LOWER EXTREMITY (HCC): ICD-10-CM

## 2022-03-18 PROCEDURE — 99214 OFFICE O/P EST MOD 30 MIN: CPT | Performed by: INTERNAL MEDICINE

## 2022-03-18 NOTE — PROGRESS NOTES
Lito Aggarwal  1955  [facility] [place] [facility] [place] [address] [address] [facility] [address]    DISCUSSION/SUMMARY:    58-year-old male with relatively good past health recently found to have a right lower extremity acute DVT and bilateral PE   Mr Branden Gaona is now on Eliquis 5 mg twice a day  Patient states feeling well, better than before  No respiratory issues, minimal right lower extremity swelling, no cords or pain  Patient denies any problems with excessive bruising or bleeding  We re-discussed options  The plan is to continue with the Eliquis for 6 months - patient has 2 months left - Mr Branden Gaona can then discontinue  Repeat lower extremity Doppler and CTA/chest has been requested  Patient is to return to the office in 3 months  As discussed above, patient could not think of any provoking incident; no familial thrombophilia  A thrombophilia evaluation is on hold for the time being  In the future, if patient is off anticoagulation and suffers another thrombotic complication, he will need anticoagulation for life (and likely need a thrombophilia evaluation)  Patient knows to call the office if he is being scheduled for any invasive procedure or surgery while on Eliquis  The recent CTA/chest demonstrated prominent paratracheal lymph nodes - likely secondary/reactive  Lymph nodes status/size will be rechecked on the next CT also  Patient is to return in 3 months  Patient knows to call the hematology/oncology office if there are any other questions or concerns  Carefully review your medication list and verify that the list is accurate and up-to-date  Please call the hematology/oncology office if there are medications missing from the list, medications on the list that you are not currently taking or if there is a dosage or instruction that is different from how you're taking that medication      Patient goals and areas of care: Anticoagulation, monitor for any respiratory issues  Barriers to care: none  Patient is able to self-care   _____________________________________________________________________________________    Chief Complaint   Patient presents with    Follow-up     DVT, PE     History of Present Illness:  69-year-old male with no significant past medical history presenting to the emergency room in mid November 2021 with right leg swelling  Workup included Dopplers and a CTA/chest   Patient was found to have a lower extremity DVT and bilateral PE  Patient was admitted, received anticoagulation with Lovenox, subsequently switched to Eliquis  Patient's clinical condition improved and Mr Rosa Melendez was discharged  Mr Rosa Melendez returns for follow-up  Patient states feeling okay, close to baseline  No lower extremity swelling or pain  Respiratory issues are less/better than before but patient still cannot take a deep breath without some pain  No problems with excessive bruising or bleeding  No cough, sputum or hemoptysis  No GI or  issues  No headaches, blurred vision or dizziness  Activities are back to baseline  No specific problems with the Eliquis  No prior thrombotic episodes, no recent potentially provoking incidence  No familial thrombophilia  Patient has received his COVID vaccination  Review of Systems   Constitutional: Negative  HENT: Negative  Eyes: Negative  Respiratory: Negative  Cardiovascular: Negative  Gastrointestinal: Negative  Endocrine: Negative  Genitourinary: Negative  Musculoskeletal: Negative  Skin: Negative  Allergic/Immunologic: Negative  Neurological: Negative  Hematological: Negative  Psychiatric/Behavioral: Negative  All other systems reviewed and are negative      Patient Active Problem List   Diagnosis    Pulmonary embolism (HCC)    Acute deep vein thrombosis (DVT) of calf muscle vein of right lower extremity (HCC)    Left lower lobe consolidation ([address] )    Lymphadenopathy    Class 2 obesity due to excess calories without serious comorbidity with body mass index (BMI) of 38 0 to 38 9 in adult     Past Medical History:   Diagnosis Date    Pneumonia      Past Surgical History:   Procedure Laterality Date    EYE SURGERY Bilateral     cataracts with IOL    ORTHOPEDIC SURGERY Left     4 th finger - tendon reattached at age 26     Family History   Problem Relation Age of Onset    Multiple myeloma Mother     Vascular Disease Father     Heart disease Father     Colon cancer Father     No Known Problems Brother      Social History     Socioeconomic History    Marital status: /Civil Union     Spouse name: Not on file    Number of children: Not on file    Years of education: Not on file    Highest education level: Not on file   Occupational History    Not on file   Tobacco Use    Smoking status: Never Smoker    Smokeless tobacco: Never Used   Vaping Use    Vaping Use: Never used   Substance and Sexual Activity    Alcohol use: Not Currently    Drug use: Never    Sexual activity: Not on file   Other Topics Concern    Not on file   Social History Narrative    Not on file     Social Determinants of Health     Financial Resource Strain: Not on file   Food Insecurity: Not on file   Transportation Needs: Not on file   Physical Activity: Not on file   Stress: Not on file   Social Connections: Not on file   Intimate Partner Violence: Not on file   Housing Stability: Not on file       Current Outpatient Medications:     apixaban (Eliquis) 5 mg, Take 1 tablet (5 mg total) by mouth 2 (two) times a day, Disp: 60 tablet, Rfl: 2    No Known Allergies    Vitals:    03/18/22 1145   BP: 122/70   Pulse: 69   Resp: 17   Temp: (!) 96 2 °F (35 7 °C)   SpO2: 97%     Physical Exam  Constitutional:       Appearance: He is well-developed  HENT:      Head: Normocephalic and atraumatic        Right Ear: External ear normal       Left Ear: External ear normal    Eyes:      Conjunctiva/sclera: Conjunctivae normal       Pupils: Pupils are equal, round, and reactive to light  Cardiovascular:      Rate and Rhythm: Normal rate and regular rhythm  Heart sounds: Normal heart sounds  Pulmonary:      Effort: Pulmonary effort is normal       Breath sounds: Normal breath sounds  Comments: Good air entry bilaterally, clear  Abdominal:      General: Bowel sounds are normal       Palpations: Abdomen is soft  Musculoskeletal:         General: Normal range of motion  Cervical back: Normal range of motion and neck supple  Skin:     General: Skin is warm  Comments: Good color, warm, moist, no petechiae or ecchymoses   Neurological:      Mental Status: He is alert and oriented to person, place, and time  Deep Tendon Reflexes: Reflexes are normal and symmetric  Psychiatric:         Behavior: Behavior normal          Thought Content: Thought content normal          Judgment: Judgment normal      Extremities:  Right lower extremity 1+ swelling, no left lower extremity swelling, no cords, pulses are 1+    Labs    02/16/2022 BUN = 19 creatinine = 0 99 LFTs WNL    12/03/2021 WBC = 6 7 hemoglobin = 14 6 hematocrit = 44 platelet = 203    93/65/2929 BUN = 9 creatinine = 0 85    Imaging    11/15/2021 CTA chest PE study    1  Multiple bilateral segmental/subsegmental pulmonary emboli  No CT evidence of right heart strain  2   Consolidative changes along the dependent aspect of the left lower lobe  This finding may represent atelectasis versus residual infectious process given left lower lobe pneumonia which was partially seen on prior CT of October 2, 2021  Attention on short-term follow-up is recommended to monitor for resolution      3   Prominent subsegmental paratracheal lymph nodes, possibly reactive in etiology  Attention on follow-up is recommended       11/15/2021 vascular lower limb venous duplex study    RIGHT LOWER LIMB  Acute occlusive deep vein thrombosis noted in the common femoral vein, femoral  vein, popliteal vein and multiple calf veins  No evidence of superficial thrombophlebitis noted  Doppler evaluation shows a normal response to augmentation maneuvers  Popliteal, posterior tibial and anterior tibial arterial Doppler waveforms are  triphasic  LEFT LOWER LIMB LIMITED  Evaluation shows no evidence of thrombus in the common femoral vein  Doppler evaluation shows a normal response to augmentation maneuvers

## 2022-06-01 ENCOUNTER — HOSPITAL ENCOUNTER (OUTPATIENT)
Dept: RADIOLOGY | Facility: HOSPITAL | Age: 67
Discharge: HOME | End: 2022-06-01
Attending: INTERNAL MEDICINE
Payer: MEDICARE

## 2022-06-01 DIAGNOSIS — I26.99 ACUTE PULMONARY EMBOLISM WITHOUT ACUTE COR PULMONALE, UNSPECIFIED PULMONARY EMBOLISM TYPE (HCC): ICD-10-CM

## 2022-06-01 DIAGNOSIS — I82.461 ACUTE DEEP VEIN THROMBOSIS (DVT) OF CALF MUSCLE VEIN OF RIGHT LOWER EXTREMITY (HCC): ICD-10-CM

## 2022-06-01 PROCEDURE — 71275 CT ANGIOGRAPHY CHEST: CPT

## 2022-06-01 PROCEDURE — 93971 EXTREMITY STUDY: CPT

## 2022-06-01 RX ADMIN — IOHEXOL 65 ML: 350 INJECTION, SOLUTION INTRAVENOUS at 13:37

## 2022-06-02 PROCEDURE — 93971 EXTREMITY STUDY: CPT | Performed by: SURGERY

## 2022-06-17 ENCOUNTER — OFFICE VISIT (OUTPATIENT)
Dept: HEMATOLOGY ONCOLOGY | Facility: MEDICAL CENTER | Age: 67
End: 2022-06-17
Payer: MEDICARE

## 2022-06-17 VITALS
DIASTOLIC BLOOD PRESSURE: 60 MMHG | OXYGEN SATURATION: 98 % | WEIGHT: 248 LBS | RESPIRATION RATE: 18 BRPM | SYSTOLIC BLOOD PRESSURE: 108 MMHG | TEMPERATURE: 97.6 F | HEIGHT: 68 IN | HEART RATE: 59 BPM | BODY MASS INDEX: 37.59 KG/M2

## 2022-06-17 DIAGNOSIS — I26.99 ACUTE PULMONARY EMBOLISM WITHOUT ACUTE COR PULMONALE, UNSPECIFIED PULMONARY EMBOLISM TYPE (HCC): Primary | ICD-10-CM

## 2022-06-17 DIAGNOSIS — I82.461 ACUTE DEEP VEIN THROMBOSIS (DVT) OF CALF MUSCLE VEIN OF RIGHT LOWER EXTREMITY (HCC): ICD-10-CM

## 2022-06-17 PROCEDURE — 99214 OFFICE O/P EST MOD 30 MIN: CPT | Performed by: INTERNAL MEDICINE

## 2022-06-17 NOTE — PROGRESS NOTES
Solomon Hermosillo  1955  [facility] [place] [id number] [facility] [id number]    DISCUSSION/SUMMARY:    15-year-old male with relatively good past health recently found to have a right lower extremity acute DVT and bilateral PE   Mr Esperanza Felty completed 6 months of anticoagulation (Eliquis)  Patient feels well, baseline  No respiratory issues; right lower extremity swelling is still minimal/same as before  We re-discussed options  Patient completed Eliquis approximately 10 days ago  Mr Esperanza Felty will monitor for progressive lower extremity swelling, cords, pain or any acute respiratory issues  The patient understands that there is still chronic DVT in the right leg  Patient will call the office if he is scheduled for any surgical procedure where he may be immobilized for any length of time (as there may be need for more aggressive postoperative prophylactic anticoagulation)  Mr Esperanza Felty stated that in retrospect, he had been replacing a floor for his daughter and was wearing tight-fitting cushions on his knees and calves for a number of hours - this may have been the precipitating incident  Thrombophilia evaluate is on hold  Patient understands that if he should have a 2nd thrombotic event, he will likely need anticoagulation for life  The follow-up CT scan did not demonstrate any enlarging lymph nodes  Patient has no pulmonary symptoms  No additional workup needed at this time  Return to hematology is on a prn basis but Mr Esperanza Felty knows to call the hematology/oncology office if there are any other questions or concerns  Carefully review your medication list and verify that the list is accurate and up-to-date   Please call the hematology/oncology office if there are medications missing from the list, medications on the list that you are not currently taking or if there is a dosage or instruction that is different from how you're taking that medication  Patient goals and areas of care: Follow up with PCP  Barriers to care: none  Patient is able to self-care   _____________________________________________________________________________________    Chief Complaint   Patient presents with    Follow-up    DVT, PE     History of Present Illness:  41-year-old male with no significant past medical history presenting to the emergency room in mid November 2021 with right leg swelling  Workup included Dopplers and a CTA/chest   Patient was found to have a lower extremity DVT and bilateral PE  Patient was admitted, received anticoagulation with Lovenox, subsequently switched to Eliquis  Patient's clinical condition improved and Mr Mil Orozco was discharged  Mr Mil Orozco has completed 6 months of Eliquis and returns for follow-up  Mr Mil Orozco states feeling okay, about the same as before  No respiratory issues, no chest pain, no cough, sputum or hemoptysis  Right lower extremity is slightly swollen, same as before  No pain  Activities are baseline  No other active medical issues  As discussed previously, no familial thrombophilia  Patient received his COVID vaccine  Review of Systems   Constitutional: Negative  HENT: Negative  Eyes: Negative  Respiratory: Negative  Cardiovascular: Positive for leg swelling  Gastrointestinal: Negative  Endocrine: Negative  Genitourinary: Negative  Musculoskeletal: Negative  Skin: Negative  Allergic/Immunologic: Negative  Neurological: Negative  Hematological: Negative  Psychiatric/Behavioral: Negative  All other systems reviewed and are negative      Patient Active Problem List   Diagnosis    Pulmonary embolism (HCC)    Acute deep vein thrombosis (DVT) of calf muscle vein of right lower extremity (HCC)    Left lower lobe consolidation (HCC)    Lymphadenopathy    Class 2 obesity due to excess calories without serious comorbidity with body mass index (BMI) of 38 0 to 38 9 in adult     Past Medical History:   Diagnosis Date    Pneumonia      Past Surgical History:   Procedure Laterality Date    EYE SURGERY Bilateral     cataracts with IOL    ORTHOPEDIC SURGERY Left     4 th finger - tendon reattached at age 26     Family History   Problem Relation Age of Onset    Multiple myeloma Mother     Vascular Disease Father     Heart disease Father     Colon cancer Father     No Known Problems Brother      Social History     Socioeconomic History    Marital status: /Civil Union     Spouse name: Not on file    Number of children: Not on file    Years of education: Not on file    Highest education level: Not on file   Occupational History    Not on file   Tobacco Use    Smoking status: Never Smoker    Smokeless tobacco: Never Used   Vaping Use    Vaping Use: Never used   Substance and Sexual Activity    Alcohol use: Not Currently    Drug use: Never    Sexual activity: Not on file   Other Topics Concern    Not on file   Social History Narrative    Not on file     Social Determinants of Health     Financial Resource Strain: Not on file   Food Insecurity: Not on file   Transportation Needs: Not on file   Physical Activity: Not on file   Stress: Not on file   Social Connections: Not on file   Intimate Partner Violence: Not on file   Housing Stability: Not on file     No current outpatient medications on file  No Known Allergies    Vitals:    06/17/22 1139   BP: 108/60   Pulse: 59   Resp: 18   Temp: 97 6 °F (36 4 °C)   SpO2: 98%     Physical Exam  Constitutional:       Appearance: He is well-developed  HENT:      Head: Normocephalic and atraumatic  Right Ear: External ear normal       Left Ear: External ear normal    Eyes:      Conjunctiva/sclera: Conjunctivae normal       Pupils: Pupils are equal, round, and reactive to light  Cardiovascular:      Rate and Rhythm: Normal rate and regular rhythm  Heart sounds: Normal heart sounds     Pulmonary:      Effort: Pulmonary effort is normal       Breath sounds: Normal breath sounds  Comments: Good air entry bilaterally, clear  Abdominal:      General: Bowel sounds are normal       Palpations: Abdomen is soft  Musculoskeletal:         General: Normal range of motion  Cervical back: Normal range of motion and neck supple  Skin:     General: Skin is warm  Comments: Good color, warm, moist, no petechiae or ecchymoses   Neurological:      Mental Status: He is alert and oriented to person, place, and time  Deep Tendon Reflexes: Reflexes are normal and symmetric  Psychiatric:         Behavior: Behavior normal          Thought Content: Thought content normal          Judgment: Judgment normal      Extremities:  Right lower extremity 1+ swelling, no left lower extremity swelling, no cords, pulses are 1+    Labs    02/16/2022 BUN = 19 creatinine = 0 99 LFTs WNL  12/03/2021 WBC = 6 7 hemoglobin = 14 6 hematocrit = 44 platelet = 562  75/54/0995 BUN = 9 creatinine = 0 85    Imaging    06/01/2022 CTA chest PE study    PULMONARY ARTERIAL TREE:  No pulmonary embolus is seen  Previously noted segmental and/or subsegmental pulmonary emboli on prior study are not evident      LUNGS:  No tracheal or endobronchial lesion is evident  Dependent pleural parenchymal changes with few dependent consolidative changes in the left lung which may reflect atelectasis      PLEURA:  Unremarkable      MEDIASTINUM AND ANGELA:  No significant change in the right paratracheal lymph nodes near the level of the thoracic inlet measuring up to 0 8 cm  Right hilar lymph node measuring 0 9 cm in short axis    IMPRESSION:     1  No evidence of acute pulmonary embolism  Resolution of the previously seen segmental and/or subsegmental pulmonary emboli  2   Dependent pleural parenchymal changes and left basilar atelectatic changes without pleural effusion or pneumothorax    3   No significant change in the right paratracheal and right hilar lymph nodes, may be reactive  4   Ancillary findings discussed above      06/01/2022 vascular lower limb venous duplex study    RIGHT LOWER LIMB  Chronic non-occlusive deep vein thrombosis noted in the proximal femoral vein,  popliteal vein and one of the gastrocnemius veins   No evidence of superficial thrombophlebitis noted  Reflux was identified in the deep system which is consistent with venous  insufficiency  Popliteal, posterior tibial and anterior tibial arterial Doppler waveforms are  triphasic  LEFT LOWER LIMB LIMITED  Evaluation shows no evidence of thrombus in the common femoral vein  Doppler evaluation shows a normal response to augmentation maneuvers  In comparison to the study of 11/15/2021, resolution of thrombus noted of the  CFV and most calf veins  11/15/2021 CTA chest PE study    1  Multiple bilateral segmental/subsegmental pulmonary emboli  No CT evidence of right heart strain  2   Consolidative changes along the dependent aspect of the left lower lobe  This finding may represent atelectasis versus residual infectious process given left lower lobe pneumonia which was partially seen on prior CT of October 2, 2021  Attention on short-term follow-up is recommended to monitor for resolution      3   Prominent subsegmental paratracheal lymph nodes, possibly reactive in etiology  Attention on follow-up is recommended  11/15/2021 vascular lower limb venous duplex study    RIGHT LOWER LIMB  Acute occlusive deep vein thrombosis noted in the common femoral vein, femoral  vein, popliteal vein and multiple calf veins  No evidence of superficial thrombophlebitis noted  Doppler evaluation shows a normal response to augmentation maneuvers  Popliteal, posterior tibial and anterior tibial arterial Doppler waveforms are  triphasic  LEFT LOWER LIMB LIMITED  Evaluation shows no evidence of thrombus in the common femoral vein    Doppler evaluation shows a normal response to augmentation maneuvers

## 2022-08-28 ENCOUNTER — HOSPITAL ENCOUNTER (EMERGENCY)
Facility: HOSPITAL | Age: 67
Discharge: HOME | End: 2022-08-28
Attending: EMERGENCY MEDICINE
Payer: MEDICARE

## 2022-08-28 VITALS
TEMPERATURE: 97.1 F | HEART RATE: 77 BPM | OXYGEN SATURATION: 100 % | RESPIRATION RATE: 18 BRPM | DIASTOLIC BLOOD PRESSURE: 77 MMHG | WEIGHT: 237.6 LBS | BODY MASS INDEX: 36.13 KG/M2 | SYSTOLIC BLOOD PRESSURE: 153 MMHG

## 2022-08-28 DIAGNOSIS — I82.890 ACUTE EMBOLISM AND THROMBOSIS OF OTHER SPECIFIED VEINS: ICD-10-CM

## 2022-08-28 DIAGNOSIS — M79.89 LEFT LEG SWELLING: Primary | ICD-10-CM

## 2022-08-28 PROCEDURE — 99284 EMERGENCY DEPT VISIT MOD MDM: CPT | Performed by: EMERGENCY MEDICINE

## 2022-08-28 PROCEDURE — 99284 EMERGENCY DEPT VISIT MOD MDM: CPT

## 2022-08-28 RX ADMIN — APIXABAN 10 MG: 5 TABLET, FILM COATED ORAL at 16:20

## 2022-08-28 NOTE — DISCHARGE INSTRUCTIONS
It is highly likely you have a DVT in the left leg  Start eliquis now  Call tomorrow to confirm your appointment for outpatient ultrasound  If negative, you can discontinue the eliquis; if positive continue it  Either way please follow up with your PCP and Dr Kalpana Spencer

## 2022-08-29 ENCOUNTER — HOSPITAL ENCOUNTER (OUTPATIENT)
Dept: RADIOLOGY | Facility: HOSPITAL | Age: 67
Discharge: HOME | End: 2022-08-29
Attending: EMERGENCY MEDICINE
Payer: MEDICARE

## 2022-08-29 ENCOUNTER — TELEPHONE (OUTPATIENT)
Dept: FAMILY MEDICINE CLINIC | Facility: CLINIC | Age: 67
End: 2022-08-29

## 2022-08-29 DIAGNOSIS — I82.890 ACUTE EMBOLISM AND THROMBOSIS OF OTHER SPECIFIED VEINS: ICD-10-CM

## 2022-08-29 DIAGNOSIS — M79.89 LEFT LEG SWELLING: ICD-10-CM

## 2022-08-29 PROCEDURE — 93971 EXTREMITY STUDY: CPT

## 2022-08-29 NOTE — ED PROVIDER NOTES
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  None       Past Medical History:   Diagnosis Date    DVT (deep venous thrombosis) (HCC)     Pneumonia     Pulmonary embolism (HCC)        Past Surgical History:   Procedure Laterality Date    EYE SURGERY Bilateral     cataracts with IOL    ORTHOPEDIC SURGERY Left     4 th finger - tendon reattached at age 26       Family History   Problem Relation Age of Onset    Multiple myeloma Mother     Vascular Disease Father     Heart disease Father     Colon cancer Father     No Known Problems Brother      I have reviewed and agree with the history as documented  E-Cigarette/Vaping    E-Cigarette Use Never User      E-Cigarette/Vaping Substances    Nicotine No     THC No     CBD No     Flavoring No     Other No     Unknown No      Social History     Tobacco Use    Smoking status: Never Smoker    Smokeless tobacco: Never Used   Vaping Use    Vaping Use: Never used   Substance Use Topics    Alcohol use: Not Currently    Drug use: Never       Review of Systems   Constitutional: Negative for fever  Respiratory: Negative for cough and shortness of breath  Gastrointestinal: Negative for abdominal pain  Musculoskeletal: Negative for back pain  Neurological: Negative for headaches  All other systems reviewed and are negative  Physical Exam  Physical Exam  Vitals reviewed  Constitutional:       Appearance: He is well-developed  HENT:      Head: Normocephalic and atraumatic        Right Ear: External ear normal       Left Ear: External ear normal       Nose: Nose normal  No rhinorrhea  Mouth/Throat:      Mouth: Mucous membranes are moist    Eyes:      Conjunctiva/sclera: Conjunctivae normal    Cardiovascular:      Rate and Rhythm: Normal rate and regular rhythm  Pulmonary:      Effort: Pulmonary effort is normal       Breath sounds: Normal breath sounds  No wheezing or rales  Abdominal:      Palpations: Abdomen is soft  Tenderness: There is no abdominal tenderness  Musculoskeletal:      Cervical back: Neck supple  Right lower leg: No edema  Left lower leg: Edema (no erythema or warmth) present  Skin:     General: Skin is warm and dry  Neurological:      Mental Status: He is alert and oriented to person, place, and time  Psychiatric:         Mood and Affect: Mood normal          Vital Signs  ED Triage Vitals [08/28/22 1450]   Temperature Pulse Respirations Blood Pressure SpO2   (!) 97 1 °F (36 2 °C) 87 18 138/74 97 %      Temp Source Heart Rate Source Patient Position - Orthostatic VS BP Location FiO2 (%)   Tympanic Monitor Sitting Left arm --      Pain Score       6           Vitals:    08/28/22 1450 08/28/22 1535   BP: 138/74 153/77   Pulse: 87 77   Patient Position - Orthostatic VS: Sitting Sitting         Visual Acuity      ED Medications  Medications   apixaban (ELIQUIS) tablet 10 mg (10 mg Oral Given 8/28/22 1620)       Diagnostic Studies  Results Reviewed     None                 VAS lower limb venous duplex study, unilateral/limited    (Results Pending)              Procedures  Procedures         ED Course                                             MDM  Number of Diagnoses or Management Options  Acute embolism and thrombosis of other specified veins   Left leg swelling  Diagnosis management comments: High clinical suspicion for recurrent DVT  No tachycardia, tachypnea or hypoxia to suggest PE  Pt re-started on Eliquis and will obtain outpatient doppler tomorrow as there is currently no US available     He will fu with hematology and PCP  First dose given here, Rx to pharmacy  Disposition  Final diagnoses:   Left leg swelling   Acute embolism and thrombosis of other specified veins      Time reflects when diagnosis was documented in both MDM as applicable and the Disposition within this note     Time User Action Codes Description Comment    8/28/2022  4:02 PM Angela Guzman [O74 [id number]] Left leg swelling     8/28/2022  4:07 PM Angela Guzman [[id number]] Acute embolism and thrombosis of other specified veins        ED Disposition     ED Disposition   Discharge    Condition   Stable    Date/Time   Sun Aug 28, 2022  4:02 PM    [place] discharge to home/self care  Follow-up Information    None         Discharge Medication List as of 8/28/2022  4:08 PM      START taking these medications    Details   apixaban (Eliquis) 5 mg 2 tabs twice daily for 7 days, then 1 tab twice daily  , Normal             Outpatient Discharge Orders   VAS lower limb venous duplex study, unilateral/limited   Standing Status: Future Standing Exp   Date: 08/28/26       PDMP Review     None          ED Provider  Electronically Signed by           Burgess Sunny DO  08/29/22 2817

## 2022-08-29 NOTE — TELEPHONE ENCOUNTER
Received tiger text from Charley Rock in Vascular  Patient had had eliquis stopped by heme/onc about 2 months ago  Was in the ER yesterday for recurrent L leg swelling, was given rx for eliquis and told to have doppler today, if positive start eliquis  Jamilah Doctor states he is positive for acute DVT in the L prox-distal femoral vein, popliteal vein, gastrocnemius veins, paired posterior tibial and peroneal veins throughout the calf  I asked her to have patient start his eliquis, which he has already picked up  Per hematology note will need lifelong eliquis since this is a second occurrence  Nurses, please call patient to schedule follow up in office ASAP

## 2022-08-30 ENCOUNTER — OFFICE VISIT (OUTPATIENT)
Dept: HEMATOLOGY ONCOLOGY | Facility: MEDICAL CENTER | Age: 67
End: 2022-08-30
Payer: MEDICARE

## 2022-08-30 VITALS
TEMPERATURE: 98 F | RESPIRATION RATE: 20 BRPM | BODY MASS INDEX: 36.34 KG/M2 | WEIGHT: 239.8 LBS | SYSTOLIC BLOOD PRESSURE: 112 MMHG | OXYGEN SATURATION: 97 % | HEART RATE: 61 BPM | HEIGHT: 68 IN | DIASTOLIC BLOOD PRESSURE: 68 MMHG

## 2022-08-30 DIAGNOSIS — Z86.711 HISTORY OF PULMONARY EMBOLISM: ICD-10-CM

## 2022-08-30 DIAGNOSIS — I82.403 RECURRENT ACUTE DEEP VEIN THROMBOSIS (DVT) OF BOTH LOWER EXTREMITIES (HCC): Primary | ICD-10-CM

## 2022-08-30 PROCEDURE — 93971 EXTREMITY STUDY: CPT | Performed by: SURGERY

## 2022-08-30 PROCEDURE — 99215 OFFICE O/P EST HI 40 MIN: CPT | Performed by: PHYSICIAN ASSISTANT

## 2022-08-30 NOTE — PROGRESS NOTES
[facility] [address] [id number]  Hematology Ambulatory [facility], 1955, [id number]  8/30/2022      Assessment and Plan   1  Recurrent acute deep vein thrombosis (DVT) of both lower extremities (HCC);  2  History of pulmonary embolism  This is a 61-year-old male with past medical history of DVT in November of 2021 of the right lower extremity and now with a 2nd episode in the left lower extremity of near occlusive disease found in late August 2022  I discussed with the patient that he is now on lifelong anticoagulation  We discussed eventual testing which includes antiphospholipid testing  We discussed that warfarin would be preferred as an anticoagulation choice over Eliquis if he was found to be positive  Patient is not opposed to transitioning anticoagulation  Patient understands he can not undergo testing for another several months  Moreover, patient is behind on colonoscopy testing  Recent CT scans of the chest abdomen pelvis did not demonstrate occult malignancy however, early stage colon cancers can be still found within the bowel and are not visible on CT scans  Patient will schedule colonoscopy in approximately three months  Patient will call and make an appointment to get this process started after his appointment today  PSA has been within normal limits, patient was a nonsmoker  Regimen:  Eliquis 10 mg twice a day x7 days followed by Eliquis 5 mg twice a day indefinitely  The patient is scheduled for follow-up in approximately 2 months  Patient voiced agreement and understanding to the above  Patient advised to call the Hematology/Oncology office with any questions and concerns regarding the above  Barrier(s) to care: None  The patient is able to self care  Rowan Tineo PA-C  Medical Oncology/Hematology  [address] [place] [address]    Subjective Chief Complaint   Patient presents with    Follow-up     Acute pulmonary embolism without acute cor pulmonale, unspecified pulmonary embolism type [facility])       History of present illness:     First episode occurred in November of 2021:  Patient seen by me as a consultation in the hospital   presented to the [facility] Emergency Room for evaluation of right lower leg pain and swelling for a week  Patient went to urgent care who was then referred to the emergency room for further testing      X-ray of the tib-fib was negative for acute osseous abnormality      Venous Doppler demonstrated a right lower extremity occlusive deep vein thrombosis noted in the common femoral vein, femoral vein, popliteal vein and multiple calf veins  Left lower extremity was negative for thrombus      CTA of the chest demonstrated multiple bilateral segmental and subsegmental pulmonary emboli without evidence of heart strain  Patient had consolidative changes likely connected to previous pneumonia, prior CT scan was completed on 10/2/21  Patient also had subsegmental paratracheal lymph nodes possibly reactive due to bilateral segmental pulmonary emboli  Follow-up was recommended      Blood work was benign, COVID testing was negative      Patient's clinical course included therapeutic Lovenox, then transitioned to Eliquis for a period of six months  June 2022 patient underwent repeat CT of the chest as well as lower extremity Dopplers  Right lower extremity demonstrated a chronic nonocclusive deep vein thrombosis in the proximal femoral vein, popliteal vein and one of the gastrocnemius veins  Left was clear  CTA was negative for pulmonary embolism  Patient was taken off Eliquis  In August of 2022 patient underwent evaluation for left lower extremity pain  Evaluation shows no evidence of thrombus in the right common femoral vein   LEFT LOWER LIMB: Acute mostly occlusive deep vein thrombosis is noted in the proximal-distal femoral, popliteal, gastrocnemius and paired posterior tibial and peroneal veins  No evidence of superficial thrombophlebitis noted  Popliteal, posterior tibial and anterior tibial arterial Doppler waveforms are triphasic      8/28/22:  Loading dose of Eliquis by Eliquis 5 mg twice a day was started, due to symtpoms      Interval history:    Review of Systems    Patient Active Problem List   Diagnosis    Pulmonary embolism (HCC)    Acute deep vein thrombosis (DVT) of calf muscle vein of right lower extremity (HCC)    Left lower lobe consolidation ([address] )    Lymphadenopathy    Class 2 obesity due to excess calories without serious comorbidity with body mass index (BMI) of 38 0 to 38 9 in adult     Past Medical History:   Diagnosis Date    DVT (deep venous thrombosis) ([address] )     Pneumonia     Pulmonary embolism (HCC)      Past Surgical History:   Procedure Laterality Date    EYE SURGERY Bilateral     cataracts with IOL    ORTHOPEDIC SURGERY Left     4 th finger - tendon reattached at age 26     Family History   Problem Relation Age of Onset    Multiple myeloma Mother     Vascular Disease Father     Heart disease Father     Colon cancer Father     No Known Problems Brother      Social History     Socioeconomic History    Marital status: /Civil Union     Spouse name: None    Number of children: None    Years of education: None    Highest education level: None   Occupational History    None   Tobacco Use    Smoking status: Never Smoker    Smokeless tobacco: Never Used   Vaping Use    Vaping Use: Never used   Substance and Sexual Activity    Alcohol use: Not Currently    Drug use: Never    Sexual activity: None   Other Topics Concern    None   Social History Narrative    None     Social Determinants of Health     Financial Resource Strain: Not on file   Food Insecurity: Not on file   Transportation Needs: Not on file   Physical Activity: Not on file   Stress: Not on file   Social Connections: Not on file   Intimate Partner Violence: Not on file   Housing Stability: Not on file       Current Outpatient Medications:     apixaban (Eliquis) 5 mg, 2 tabs twice daily for 7 days, then 1 tab twice daily  , Disp: 70 tablet, Rfl: 0  No Known Allergies    Objective   /68 (BP Location: Right arm, Patient Position: Sitting, Cuff Size: Adult)   Pulse 61   Temp 98 °F (36 7 °C) (Temporal)   Resp 20   Ht 5' 8" (1 727 m)   Wt 109 kg (239 lb 12 8 oz)   SpO2 97%   BMI 36 46 kg/m²    Physical Exam  Constitutional:       General: He is not in acute distress  Appearance: He is well-developed  HENT:      Head: Normocephalic and atraumatic  Right Ear: External ear normal       Left Ear: External ear normal       Nose: Nose normal    Eyes:      General: No scleral icterus  Conjunctiva/sclera: Conjunctivae normal    Cardiovascular:      Rate and Rhythm: Normal rate  Pulmonary:      Effort: No respiratory distress  Abdominal:      General: There is no distension  Palpations: Abdomen is soft  Musculoskeletal:      Right lower leg: Edema present  Left lower leg: Edema present  Skin:     Findings: No rash (on exposed skin  )  Neurological:      Mental Status: He is alert and oriented to person, place, and time  Psychiatric:         Thought Content: Thought content normal          Result Review  Labs:  No visits with results within 3 Week(s) from this visit     Latest known visit with results is:   Appointment on 02/16/2022   Component Date Value Ref Range Status    Sodium 02/16/2022 141  136 - 145 mmol/L Final    Potassium 02/16/2022 4 3  3 5 - 5 3 mmol/L Final    Chloride 02/16/2022 110 (A) 100 - 108 mmol/L Final    CO2 02/16/2022 26  21 - 32 mmol/L Final    ANION GAP 02/16/2022 5  4 - 13 mmol/L Final    BUN 02/16/2022 19  5 - 25 mg/dL Final    Creatinine 02/16/2022 0 99  0 60 - 1 30 mg/dL Final    Standardized to IDMS reference method    Glucose, Fasting 02/16/2022 90  65 - 99 mg/dL Final    Specimen collection should occur prior to Sulfasalazine administration due to the potential for falsely depressed results  Specimen collection should occur prior to Sulfapyridine administration due to the potential for falsely elevated results   Calcium 02/16/2022 9 0  8 3 - 10 1 mg/dL Final    AST 02/16/2022 15  5 - 45 U/L Final    Specimen collection should occur prior to Sulfasalazine administration due to the potential for falsely depressed results   ALT 02/16/2022 24  12 - 78 U/L Final    Specimen collection should occur prior to Sulfasalazine and/or Sulfapyridine administration due to the potential for falsely depressed results   Alkaline Phosphatase 02/16/2022 76  46 - 116 U/L Final    Total Protein 02/16/2022 7 3  6 4 - 8 2 g/dL Final    Albumin 02/16/2022 3 8  3 5 - 5 0 g/dL Final    Total Bilirubin 02/16/2022 0 43  0 20 - 1 00 mg/dL Final    Use of this assay is not recommended for patients undergoing treatment with eltrombopag due to the potential for falsely elevated results   eGFR 02/16/2022 79  ml/min/1 73sq m Final    Cholesterol 02/16/2022 231 (A) See Comment mg/dL Final    Cholesterol:         Pediatric <18 Years        Desirable          <170 mg/dL      Borderline High    170-199 mg/dL      High               >=200 mg/dL        Adult >=18 Years            Desirable         <200 mg/dL      Borderline High   200-239 mg/dL      High              >239 mg/dL      Triglycerides 02/16/2022 90  See Comment mg/dL Final    Triglyceride:     0-9Y            <75mg/dL     10Y-17Y         <90 mg/dL       >=18Y     Normal          <150 mg/dL     Borderline High 150-199 mg/dL     High            200-499 mg/dL        Very High       >499 mg/dL    Specimen collection should occur prior to N-Acetylcysteine or Metamizole administration due to the potential for falsely depressed results      HDL, Direct 02/16/2022 54  >=40 mg/dL Final    Specimen collection should occur prior to Metamizole administration due to the potential for falsley depressed results   LDL Calculated 02/16/2022 159 (A) 0 - 100 mg/dL Final    LDL Cholesterol:     Optimal           <100 mg/dl     Near Optimal      100-129 mg/dl     Above Optimal       Borderline High 130-159 mg/dl       High            160-189 mg/dl       Very High       >189 mg/dl         This screening LDL is a calculated result  It does not have the accuracy of the Direct Measured LDL in the monitoring of patients with hyperlipidemia and/or statin therapy  Direct Measure LDL (NYO942) must be ordered separately in these patients   Non-HDL-Chol (CHOL-HDL) 02/16/2022 177  mg/dl Final    PSA 02/16/2022 2 0  0 0 - 4 0 ng/mL Final    American Urological Association Guidelines define biochemical recurrence of prostate cancer as a detectable or rising PSA value post-radical prostatectomy that is greater than or equal to 0 2 ng/mL with a second confirmatory level of greater than or equal to 0 2 ng/mL  Imaging:   VAS lower limb venous duplex study, unilateral/limited     [facility] REPORT  CLINICAL:  Indications:  Patient presents with history of DVT and left lower extremity pain and swelling  x1 week  Operative History:  No prior cardiovascular surgeries  Risk Factors  The patient has history of Obesity, PE and DVT  FINDINGS:     Left           Impression                FV Prox        Occlusive Subsegmental    FV Mid         Occlusive Subsegmental    FV Dist        Occlusive Subsegmental    Gastrocnemius  Occlusive Subsegmental    Peroneal       Occlusive Subsegmental    Popliteal      Occlusive Subsegmental    PostTibial     Occlusive Subsegmental             CONCLUSION:     Impression:     RIGHT LOWER LIMB LIMITED:  Evaluation shows no evidence of thrombus in the common femoral vein  Doppler evaluation shows a normal response to augmentation maneuvers       LEFT LOWER LIMB:  Acute mostly occlusive deep vein thrombosis is noted in the proximal-distal  femoral, popliteal, gastrocnemius and paired posterior tibial and peroneal  veins  No evidence of superficial thrombophlebitis noted  Popliteal, posterior tibial and anterior tibial arterial Doppler waveforms are  triphasic  Technical findings were given to Dr Eneida Kessler and Dr Devon Cosby via  [employer] and posted to chart  Per physicians, patient is to   prescription and follow up with them  SIGNATURE:  Electronically Signed by: Kay Gil MD, RPVI on 2022-08-30 09:49:29 AM      Please note: This report has been generated by a voice recognition software system  Therefore there may be syntax, spelling, and/or grammatical errors  Please call if you have any questions

## 2022-09-29 DIAGNOSIS — M79.89 LEFT LEG SWELLING: ICD-10-CM

## 2022-10-31 ENCOUNTER — TELEPHONE (OUTPATIENT)
Dept: HEMATOLOGY ONCOLOGY | Facility: MEDICAL CENTER | Age: 67
End: 2022-10-31

## 2022-11-14 DIAGNOSIS — I82.403 RECURRENT ACUTE DEEP VEIN THROMBOSIS (DVT) OF BOTH LOWER EXTREMITIES (HCC): Primary | ICD-10-CM

## 2022-11-14 DIAGNOSIS — Z86.711 HISTORY OF PULMONARY EMBOLISM: ICD-10-CM

## 2022-12-06 ENCOUNTER — OFFICE VISIT (OUTPATIENT)
Dept: HEMATOLOGY ONCOLOGY | Facility: MEDICAL CENTER | Age: 67
End: 2022-12-06

## 2022-12-06 VITALS
DIASTOLIC BLOOD PRESSURE: 60 MMHG | SYSTOLIC BLOOD PRESSURE: 130 MMHG | HEART RATE: 56 BPM | OXYGEN SATURATION: 95 % | RESPIRATION RATE: 18 BRPM | BODY MASS INDEX: 35.61 KG/M2 | WEIGHT: 235 LBS | HEIGHT: 68 IN

## 2022-12-06 DIAGNOSIS — Z86.711 HISTORY OF PULMONARY EMBOLISM: ICD-10-CM

## 2022-12-06 DIAGNOSIS — I82.403 RECURRENT ACUTE DEEP VEIN THROMBOSIS (DVT) OF BOTH LOWER EXTREMITIES (HCC): Primary | ICD-10-CM

## 2022-12-06 NOTE — PATIENT INSTRUCTIONS
Stop Xarelto February 27th through March 6th  On the Morning of March 6th go for thrombosis panel  Restart Xarelto on March 6th AFTER blood work is completed  Continue with daily dosing after

## 2022-12-06 NOTE — PROGRESS NOTES
[facility] [address] [address]  Hematology Ambulatory [facility], 1955, [id number]  12/6/2022      Assessment and Plan   1  Recurrent acute deep vein thrombosis (DVT) of both lower extremities (HCC)  2  History of pulmonary embolism  This is a 45-year-old male with past medical history of DVT in November of 2021 of the right lower extremity and now with a 2nd episode in the left lower extremity of near occlusive disease found in late August 2022  Previously patient was advised to continue on lifelong anticoagulation  Due to insurance coverage changes, patient will start on Xarelto in 2023, after finishing Eliquis prescription  Interruptions of Xarelto:   · Patient will be due for colonoscopy and has discontinued medication 3 days prior  Patient understands to continue to be mobile while off of anticoagulation therapy  · Hypercoagulable testing:  · Off of Xarelto starting February 27 through March 6  Patient will go for hypercoagulable testing on March 6 and then continue Xarelto after  Signs and symptoms of lower extremity DVT and pulmonary embolism reviewed  Patient understands contact the office with any questions or concerns  At this time, lower extremities are stable no additional testing is required today  Regimen:  Xarleto 20 mg PO daily    The patient is scheduled for follow-up in approximately 3 months  Patient voiced agreement and understanding to the above  Patient advised to call the Hematology/Oncology office with any questions and concerns regarding the above  Barrier(s) to care: None  The patient is able to self care  Rowan Tineo PA-C  Medical Oncology/Hematology  [address]    Subjective     Chief Complaint   Patient presents with   • Follow-up     Recurrent acute deep vein thrombosis DVT        History of present illness:    This is a 61-year-old male with past medical history of obesity and PE DVT has been following with hematology since December 2021  First episode occurred in November of 2021:  Patient seen by me as a consultation in the hospital   presented to the [facility] Emergency Room for evaluation of right lower leg pain and swelling for a week   Patient went to urgent care who was then referred to the emergency room for further testing      X-ray of the tib-fib was negative for acute osseous abnormality      Venous Doppler demonstrated a right lower extremity occlusive deep vein thrombosis noted in the common femoral vein, femoral vein, popliteal vein and multiple calf veins   Left lower extremity was negative for thrombus      CTA of the chest demonstrated multiple bilateral segmental and subsegmental pulmonary emboli without evidence of heart strain   Patient had consolidative changes likely connected to previous pneumonia, prior CT scan was completed on 10/2/21   Patient also had subsegmental paratracheal lymph nodes possibly reactive due to bilateral segmental pulmonary emboli   Follow-up was recommended      Blood work was benign, COVID testing was negative      Patient's clinical course included therapeutic Lovenox, then transitioned to Eliquis for a period of six months      June 2022 patient underwent repeat CT of the chest as well as lower extremity Dopplers  Right lower extremity demonstrated a chronic nonocclusive deep vein thrombosis in the proximal femoral vein, popliteal vein and one of the gastrocnemius veins  Left was clear  CTA was negative for pulmonary embolism  Patient was taken off Eliquis      In August of 2022 patient underwent evaluation for left lower extremity pain  Evaluation shows no evidence of thrombus in the right common femoral vein   LEFT LOWER LIMB: Acute mostly occlusive deep vein thrombosis is noted in the proximal-distal femoral, popliteal, gastrocnemius and paired posterior tibial and peroneal veins  No evidence of superficial thrombophlebitis noted  Popliteal, posterior tibial and anterior tibial arterial Doppler waveforms are triphasic      8/28/22:  Loading dose of Eliquis by Eliquis 5 mg twice a day was started, due to symtpoms  11/2/2022: Insurance notification changes, patient will transition to [address] [place] [address] in 2023  Prescription provided  Interval history: Patient is doing well on Xarelto  Patient does note that he bruises easier  Patient has scheduled a colonoscopy as recommended during her last appointment and will be completed this week  Anticoagulation bridging discussed  No additional leg pain  Patient does note some weakness when he is using his legs for prolonged period of time, physical exam did demonstrate varicosities  Review of Systems   Constitutional: Negative for activity change, appetite change, fatigue and fever  HENT: Negative for nosebleeds  Respiratory: Negative for cough, choking and shortness of breath  Cardiovascular: Negative for chest pain, palpitations and leg swelling  Gastrointestinal: Negative for abdominal distention, abdominal pain, anal bleeding, blood in stool, constipation, diarrhea, nausea and vomiting  Endocrine: Negative for cold intolerance  Genitourinary: Negative for hematuria  Musculoskeletal: Negative for myalgias  Skin: Negative for color change, pallor and rash  Allergic/Immunologic: Negative for immunocompromised state  Neurological: Negative for headaches  Hematological: Negative for adenopathy  Does not bruise/bleed easily  All other systems reviewed and are negative        Patient Active Problem List   Diagnosis   • Pulmonary embolism (HCC)   • Acute deep vein thrombosis (DVT) of calf muscle vein of right lower extremity (HCC)   • Left lower lobe consolidation ([address] )   • Lymphadenopathy   • Class 2 obesity due to excess calories without serious comorbidity with body mass index (BMI) of 38 0 to 38 9 in adult Past Medical History:   Diagnosis Date   • DVT (deep venous thrombosis) (HCC)    • Pneumonia    • Pulmonary embolism (HCC)      Past Surgical History:   Procedure Laterality Date   • EYE SURGERY Bilateral     cataracts with IOL   • ORTHOPEDIC SURGERY Left     4 th finger - tendon reattached at age 26     Family History   Problem Relation Age of Onset   • Multiple myeloma Mother    • Vascular Disease Father    • Heart disease Father    • Colon cancer Father    • No Known Problems Brother      Social History     Socioeconomic History   • Marital status: /Civil Union     Spouse name: None   • Number of children: None   • Years of education: None   • Highest education level: None   Occupational History   • None   Tobacco Use   • Smoking status: Never   • Smokeless tobacco: Never   Vaping Use   • Vaping Use: Never used   Substance and Sexual Activity   • Alcohol use: Not Currently   • Drug use: Never   • Sexual activity: None   Other Topics Concern   • None   Social History Narrative   • None     Social Determinants of Health     Financial Resource Strain: Not on file   Food Insecurity: Not on file   Transportation Needs: Not on file   Physical Activity: Not on file   Stress: Not on file   Social Connections: Not on file   Intimate Partner Violence: Not on file   Housing Stability: Not on file       Current Outpatient Medications:   •  rivaroxaban (Xarelto) 20 mg tablet, Take 1 tablet (20 mg total) by mouth daily with breakfast (Patient not taking: Reported on 12/6/2022), Disp: 90 tablet, Rfl: 2  No Known Allergies    Objective   /60 (BP Location: Left arm, Patient Position: Sitting, Cuff Size: Adult)   Pulse 56   Resp 18   Ht 5' 8" (1 727 m)   Wt 107 kg (235 lb)   SpO2 95%   BMI 35 73 kg/m²    Physical Exam  Constitutional:       General: He is not in acute distress  Appearance: He is obese  HENT:      Head: Normocephalic  Comments: Scratch on pt left forehead from fall    No blunt head trauma     Right Ear: External ear normal       Left Ear: External ear normal    Cardiovascular:      Rate and Rhythm: Bradycardia present  Pulmonary:      Effort: Pulmonary effort is normal  No respiratory distress  Musculoskeletal:      Right lower leg: No edema  Left lower leg: No edema  Comments: Varicosities noted on the left lower extremity  Result Review  No visits with results within 3 Week(s) from this visit  Latest known visit with results is:   Appointment on 02/16/2022   Component Date Value Ref Range Status   • Sodium 02/16/2022 141  136 - 145 mmol/L Final   • Potassium 02/16/2022 4 3  3 5 - 5 3 mmol/L Final   • Chloride 02/16/2022 110 (H)  100 - 108 mmol/L Final   • CO2 02/16/2022 26  21 - 32 mmol/L Final   • ANION GAP 02/16/2022 5  4 - 13 mmol/L Final   • BUN 02/16/2022 19  5 - 25 mg/dL Final   • Creatinine 02/16/2022 0 99  0 60 - 1 30 mg/dL Final    Standardized to IDMS reference method   • Glucose, Fasting 02/16/2022 90  65 - 99 mg/dL Final    Specimen collection should occur prior to Sulfasalazine administration due to the potential for falsely depressed results  Specimen collection should occur prior to Sulfapyridine administration due to the potential for falsely elevated results  • Calcium 02/16/2022 9 0  8 3 - 10 1 mg/dL Final   • AST 02/16/2022 15  5 - 45 U/L Final    Specimen collection should occur prior to Sulfasalazine administration due to the potential for falsely depressed results  • ALT 02/16/2022 24  12 - 78 U/L Final    Specimen collection should occur prior to Sulfasalazine and/or Sulfapyridine administration due to the potential for falsely depressed results      • Alkaline Phosphatase 02/16/2022 76  46 - 116 U/L Final   • Total Protein 02/16/2022 7 3  6 4 - 8 2 g/dL Final   • Albumin 02/16/2022 3 8  3 5 - 5 0 g/dL Final   • Total Bilirubin 02/16/2022 0 43  0 20 - 1 00 mg/dL Final    Use of this assay is not recommended for patients undergoing treatment with eltrombopag due to the potential for falsely elevated results  • eGFR 02/16/2022 79  ml/min/1 73sq m Final   • Cholesterol 02/16/2022 231 (H)  See Comment mg/dL Final    Cholesterol:         Pediatric <18 Years        Desirable          <170 mg/dL      Borderline High    170-199 mg/dL      High               >=200 mg/dL        Adult >=18 Years            Desirable         <200 mg/dL      Borderline High   200-239 mg/dL      High              >239 mg/dL     • Triglycerides 02/16/2022 90  See Comment mg/dL Final    Triglyceride:     0-9Y            <75mg/dL     10Y-17Y         <90 mg/dL       >=18Y     Normal          <150 mg/dL     Borderline High 150-199 mg/dL     High            200-499 mg/dL        Very High       >499 mg/dL    Specimen collection should occur prior to N-Acetylcysteine or Metamizole administration due to the potential for falsely depressed results  • HDL, Direct 02/16/2022 54  >=40 mg/dL Final    Specimen collection should occur prior to Metamizole administration due to the potential for falsley depressed results  • LDL Calculated 02/16/2022 159 (H)  0 - 100 mg/dL Final    LDL Cholesterol:     Optimal           <100 mg/dl     Near Optimal      100-129 mg/dl     Above Optimal       Borderline High 130-159 mg/dl       High            160-189 mg/dl       Very High       >189 mg/dl         This screening LDL is a calculated result  It does not have the accuracy of the Direct Measured LDL in the monitoring of patients with hyperlipidemia and/or statin therapy  Direct Measure LDL (HYQ523) must be ordered separately in these patients     • Non-HDL-Chol (CHOL-HDL) 02/16/2022 177  mg/dl Final   • PSA 02/16/2022 2 0  0 0 - 4 0 ng/mL Final    American Urological Association Guidelines define biochemical recurrence of prostate cancer as a detectable or rising PSA value post-radical prostatectomy that is greater than or equal to 0 2 ng/mL with a second confirmatory level of greater than or equal to 0 2 ng/mL  Please note: This report has been generated by a voice recognition software system  Therefore there may be syntax, spelling, and/or grammatical errors  Please call if you have any questions

## 2022-12-09 ENCOUNTER — ANESTHESIA (OUTPATIENT)
Dept: GASTROENTEROLOGY | Facility: AMBULARY SURGERY CENTER | Age: 67
End: 2022-12-09

## 2022-12-09 ENCOUNTER — ANESTHESIA EVENT (OUTPATIENT)
Dept: GASTROENTEROLOGY | Facility: AMBULARY SURGERY CENTER | Age: 67
End: 2022-12-09

## 2022-12-09 ENCOUNTER — HOSPITAL ENCOUNTER (OUTPATIENT)
Dept: GASTROENTEROLOGY | Facility: AMBULARY SURGERY CENTER | Age: 67
Setting detail: OUTPATIENT SURGERY
Discharge: HOME | End: 2022-12-09
Attending: COLON & RECTAL SURGERY

## 2022-12-09 VITALS
BODY MASS INDEX: 33.77 KG/M2 | SYSTOLIC BLOOD PRESSURE: 119 MMHG | HEIGHT: 69 IN | DIASTOLIC BLOOD PRESSURE: 70 MMHG | OXYGEN SATURATION: 98 % | TEMPERATURE: 97.3 F | HEART RATE: 66 BPM | RESPIRATION RATE: 18 BRPM | WEIGHT: 228 LBS

## 2022-12-09 DIAGNOSIS — Z12.11 SCREENING FOR COLON CANCER: ICD-10-CM

## 2022-12-09 RX ORDER — PROPOFOL 10 MG/ML
INJECTION, EMULSION INTRAVENOUS AS NEEDED
Status: DISCONTINUED | OUTPATIENT
Start: 2022-12-09 | End: 2022-12-09

## 2022-12-09 RX ORDER — SODIUM CHLORIDE, SODIUM LACTATE, POTASSIUM CHLORIDE, CALCIUM CHLORIDE 600; 310; 30; 20 MG/100ML; MG/100ML; MG/100ML; MG/100ML
INJECTION, SOLUTION INTRAVENOUS CONTINUOUS PRN
Status: DISCONTINUED | OUTPATIENT
Start: 2022-12-09 | End: 2022-12-09

## 2022-12-09 RX ORDER — LIDOCAINE HYDROCHLORIDE 10 MG/ML
INJECTION, SOLUTION EPIDURAL; INFILTRATION; INTRACAUDAL; PERINEURAL AS NEEDED
Status: DISCONTINUED | OUTPATIENT
Start: 2022-12-09 | End: 2022-12-09

## 2022-12-09 RX ORDER — PROPOFOL 10 MG/ML
INJECTION, EMULSION INTRAVENOUS CONTINUOUS PRN
Status: DISCONTINUED | OUTPATIENT
Start: 2022-12-09 | End: 2022-12-09

## 2022-12-09 RX ADMIN — LIDOCAINE HYDROCHLORIDE 50 MG: 10 INJECTION, SOLUTION EPIDURAL; INFILTRATION; INTRACAUDAL at 09:56

## 2022-12-09 RX ADMIN — SODIUM CHLORIDE, SODIUM LACTATE, POTASSIUM CHLORIDE, AND CALCIUM CHLORIDE: .6; .31; .03; .02 INJECTION, SOLUTION INTRAVENOUS at 09:56

## 2022-12-09 RX ADMIN — PROPOFOL 200 MG: 10 INJECTION, EMULSION INTRAVENOUS at 09:56

## 2022-12-09 RX ADMIN — PROPOFOL 120 MCG/KG/MIN: 10 INJECTION, EMULSION INTRAVENOUS at 09:57

## 2022-12-09 NOTE — ANESTHESIA POSTPROCEDURE EVALUATION
Post-Op Assessment Note    CV Status:  Stable  Pain Score: 0    Pain management: adequate     Mental Status:  Sleepy   Hydration Status:  Stable and euvolemic   PONV Controlled:  None   Airway Patency:  Patent      Post Op Vitals Reviewed: Yes      Staff: CRNA         No notable events documented      BP   110/62   Temp     Pulse 53   Resp 18   SpO2 99

## 2022-12-09 NOTE — ANESTHESIA PREPROCEDURE EVALUATION
Procedure:  COLONOSCOPY    Relevant Problems   CARDIO   (+) Acute deep vein thrombosis (DVT) of calf muscle vein of right lower extremity (HCC)        Physical Exam    Airway    Mallampati score: II  TM Distance: >3 FB  Neck ROM: full     Dental       Cardiovascular      Pulmonary      Other Findings        Anesthesia Plan  ASA Score- 2     Anesthesia Type- IV sedation with anesthesia with ASA Monitors  Additional Monitors:   Airway Plan:           Plan Factors-    Chart reviewed  Induction- intravenous  Postoperative Plan-     Informed Consent- Anesthetic plan and risks discussed with patient  I personally reviewed this patient with the CRNA  Discussed and agreed on the Anesthesia Plan with the CRNA  Matt Villatoro

## 2022-12-09 NOTE — H&P
History and Physical   Colon and Rectal Surgery   Yevgeniy Dodge 78 y o  male MRN: [id number]  Unit/Bed#:  Encounter: [id number]  12/09/22   @NOW    No chief complaint on file  History of Present Illness   HPI:  Yevgeniy Dodge is a 78 y o  male who presents for colorectal cancer screening      Historical Information   Past Medical History:   Diagnosis Date   • DVT (deep venous thrombosis) ([facility] [address] )    • Pneumonia    • Pulmonary embolism (HCC)      Past Surgical History:   Procedure Laterality Date   • EYE SURGERY Bilateral     cataracts with IOL   • ORTHOPEDIC SURGERY Left     4 th finger - tendon reattached at age 26       Meds/Allergies     (Not in a hospital admission)        Current Outpatient Medications:   •  rivaroxaban (Xarelto) 20 mg tablet, Take 1 tablet (20 mg total) by mouth daily with breakfast (Patient not taking: Reported on 12/6/2022), Disp: 90 tablet, Rfl: 2    No Known Allergies      Social History   Social History     Substance and Sexual Activity   Alcohol Use Not Currently     Social History     Substance and Sexual Activity   Drug Use Never     Social History     Tobacco Use   Smoking Status Never   Smokeless Tobacco Never         Family History:   Family History   Problem Relation Age of Onset   • Multiple myeloma Mother    • Vascular Disease Father    • Heart disease Father    • Colon cancer Father    • No Known Problems Brother          Objective     Current Vitals:      No intake or output data in the 24 hours ending 12/09/22 0912    Physical Exam:  General:  Resting comfortably in bed   Eyes:Sclera anicteric  ENT: Trachea midline  Pulm:  Symmetric chest raise  No respiratory Distress  CV:  Regular on monitor  Abdomen:  Soft NT ND  Extremities:  No clubbing/ cyanosis/ edema    Lab Results: I have personally reviewed pertinent lab results  Imaging: I have personally reviewed pertinent reports          ASSESSMENT:  Yevgeniy Dodge is a 78 y o  male who presents for outpatient colonoscopy  PLAN:  For colonoscopy    Risks/ Benefits reviewed to include but not limited to anesthesia, bleeding, missed lesions, and colonoscopic perforation requiring surgery

## 2023-03-06 ENCOUNTER — APPOINTMENT (OUTPATIENT)
Dept: LAB | Facility: HOSPITAL | Age: 68
End: 2023-03-06

## 2023-03-06 DIAGNOSIS — I82.403 RECURRENT ACUTE DEEP VEIN THROMBOSIS (DVT) OF BOTH LOWER EXTREMITIES (HCC): ICD-10-CM

## 2023-03-06 DIAGNOSIS — Z86.711 HISTORY OF PULMONARY EMBOLISM: ICD-10-CM

## 2023-03-06 LAB — D DIMER PPP FEU-MCNC: 0.32 UG/ML FEU

## 2023-03-07 LAB
B2 GLYCOPROT1 IGA SERPL IA-ACNC: 1.6
B2 GLYCOPROT1 IGG SERPL IA-ACNC: 1.2
B2 GLYCOPROT1 IGM SERPL IA-ACNC: <2.4
CARDIOLIPIN IGA SER IA-ACNC: 1.8
CARDIOLIPIN IGG SER IA-ACNC: 2.9
CARDIOLIPIN IGM SER IA-ACNC: <0.9
DEPRECATED AT III PPP: 90 % OF NORMAL (ref 92–136)

## 2023-03-08 LAB
APTT SCREEN TO CONFIRM RATIO: 1.09 RATIO (ref 0–1.34)
CONFIRM APTT/NORMAL: 43.4 SEC (ref 0–47.6)
FACT XIIIA PPP-ACNC: 179 % (ref 56–140)
LA PPP-IMP: NORMAL
PROT S ACT/NOR PPP: 88 % (ref 61–136)
PROT S PPP-ACNC: 91 % (ref 60–150)
SCREEN APTT: 41.1 SEC (ref 0–43.5)
SCREEN DRVVT: 41 SEC (ref 0–47)
THROMBIN TIME: 17.7 SEC (ref 0–23)

## 2023-03-09 LAB
PROT C AG ACT/NOR PPP IA: 131 % OF NORMAL (ref 60–150)
PROT S ACT/NOR PPP: 77 % (ref 71–117)

## 2023-03-12 LAB
F2 GENE MUT ANL BLD/T: NORMAL
PROTHROMBIN G20210A MUTATION INTERPRETATION: NORMAL

## 2023-03-13 LAB — F5 GENE MUT ANL BLD/T: NORMAL

## 2023-03-21 ENCOUNTER — OFFICE VISIT (OUTPATIENT)
Dept: HEMATOLOGY ONCOLOGY | Facility: MEDICAL CENTER | Age: 68
End: 2023-03-21

## 2023-03-21 VITALS
HEIGHT: 69 IN | BODY MASS INDEX: 35.37 KG/M2 | RESPIRATION RATE: 18 BRPM | HEART RATE: 58 BPM | TEMPERATURE: 97.9 F | WEIGHT: 238.8 LBS | SYSTOLIC BLOOD PRESSURE: 114 MMHG | OXYGEN SATURATION: 99 % | DIASTOLIC BLOOD PRESSURE: 62 MMHG

## 2023-03-21 DIAGNOSIS — I82.403 RECURRENT ACUTE DEEP VEIN THROMBOSIS (DVT) OF BOTH LOWER EXTREMITIES (HCC): Primary | ICD-10-CM

## 2023-03-21 DIAGNOSIS — Z86.711 HISTORY OF PULMONARY EMBOLISM: ICD-10-CM

## 2023-03-21 NOTE — PROGRESS NOTES
[facility] [address] [address]  Hematology Ambulatory [facility], 1955, [id number]  3/21/2023      Assessment and Plan   1  Recurrent acute deep vein thrombosis (DVT) of both lower extremities (HCC)  2  History of pulmonary embolism  This is a 27-year-old male with past medical history of DVT November 2020 one of the right lower extremity and the second episode of the left lower extremity with near occlusive disease found in late August 2022  Patient's hypercoagulable work-up did not demonstrate significant abnormalities  Patient is safe to continue on NOAC, as antiphospholipid antibodies were all negative  Patient is doing well on Xarelto no significant toxicity  We discussed follow-up including hematology or seeing PCP  Patient wishes to follow-up with hematology 1 the time  We will have discussion in November about formulary coverage of blood thinner  If at that time the patient is comfortable, he will likely be discharged back to primary care for further refills and evaluation on lifelong anticoagulation  Regimen:  Xarleto 20 mg PO daily      The patient is scheduled for follow-up in approximately 7-8 months  Patient voiced agreement and understanding to the above  Patient advised to call the Hematology/Oncology office with any questions and concerns regarding the above  Barrier(s) to care: None  The patient is  able to self care  Rowan Tineo PA-C  Medical Oncology/Hematology  [facility]    Subjective     Chief Complaint   Patient presents with   • Follow-up     Recurrent acute deep vein thrombosis (DVT) of both lower extremities        History of present illness:    This is a 27-year-old male with past medical history of obesity and PE DVT has been following with hematology since December 2021      First episode occurred in November of 2021:  Patient seen by me as a consultation in the hospital   presented to the [facility] Emergency Room for evaluation of right lower leg pain and swelling for a week   Patient went to urgent care who was then referred to the emergency room for further testing      X-ray of the tib-fib was negative for acute osseous abnormality      Venous Doppler demonstrated a right lower extremity occlusive deep vein thrombosis noted in the common femoral vein, femoral vein, popliteal vein and multiple calf veins   Left lower extremity was negative for thrombus      CTA of the chest demonstrated multiple bilateral segmental and subsegmental pulmonary emboli without evidence of heart strain   Patient had consolidative changes likely connected to previous pneumonia, prior CT scan was completed on 10/2/21   Patient also had subsegmental paratracheal lymph nodes possibly reactive due to bilateral segmental pulmonary emboli   Follow-up was recommended      Blood work was benign, COVID testing was negative      Patient's clinical course included therapeutic Sandre Pap transitioned to Eliquis for a period of six months      June 2022 patient underwent repeat CT of the chest as well as lower extremity Dopplers   Right lower extremity demonstrated a chronic nonocclusive deep vein thrombosis in the proximal femoral vein, popliteal vein and one of the gastrocnemius veins   Left was clear  Sherine Peasant was negative for pulmonary embolism   Patient was taken off Eliquis      In August of 2022 patient underwent evaluation for left lower extremity pain  Evaluation shows no evidence of thrombus in the right common femoral vein   LEFT LOWER LIMB: Acute mostly occlusive deep vein thrombosis is noted in the proximal-distal femoral, popliteal, gastrocnemius and paired posterior tibial and peroneal veins   No evidence of superficial thrombophlebitis noted   Popliteal, posterior tibial and anterior tibial arterial Doppler waveforms are triphasic      8/28/22:  Loading dose of Eliquis by Eliquis 5 mg twice a day was started, due to symtpoms      11/2/2022: Insurance notification changes, patient will transition to [facility] in 2023  Prescription provided  3/6/2023 Thrombosis panel-Antithrombin III antibody 90% (L), factor VIII activity 179%, otherwise normal D-dimer, antiphospholipid antibodies(LA/glycoprotein/cardiolipin), protein C and protein S, and factor V Leiden and prothrombin gene mutations negative  Interval history: Doing well no significant sequela or toxicity of Xarelto  Review of Systems   Constitutional: Negative for activity change, appetite change, fatigue and fever  HENT: Negative for nosebleeds  Respiratory: Negative for cough, choking and shortness of breath  Cardiovascular: Negative for chest pain, palpitations and leg swelling  Gastrointestinal: Negative for abdominal distention, abdominal pain, anal bleeding, blood in stool, constipation, diarrhea, nausea and vomiting  Endocrine: Negative for cold intolerance  Genitourinary: Negative for hematuria  Musculoskeletal: Negative for myalgias  Skin: Negative for color change, pallor and rash  Allergic/Immunologic: Negative for immunocompromised state  Neurological: Negative for headaches  Hematological: Negative for adenopathy  Does not bruise/bleed easily  All other systems reviewed and are negative        Patient Active Problem List   Diagnosis   • Pulmonary embolism (HCC)   • Acute deep vein thrombosis (DVT) of calf muscle vein of right lower extremity (HCC)   • Left lower lobe consolidation ([address] )   • Lymphadenopathy   • Class 2 obesity due to excess calories without serious comorbidity with body mass index (BMI) of 38 0 to 38 9 in adult     Past Medical History:   Diagnosis Date   • DVT (deep venous thrombosis) ([address] )    • Pneumonia    • Pulmonary embolism ([address] )      Past Surgical History:   Procedure Laterality Date   • EYE SURGERY Bilateral     cataracts with IOL   • ORTHOPEDIC SURGERY Left     4 th finger - tendon reattached at age 26   • TONSILLECTOMY       Family History   Problem Relation Age of Onset   • Multiple myeloma Mother    • Vascular Disease Father    • Heart disease Father    • Colon cancer Father    • No Known Problems Brother      Social History     Socioeconomic History   • Marital status: /Civil Union     Spouse name: None   • Number of children: None   • Years of education: None   • Highest education level: None   Occupational History   • None   Tobacco Use   • Smoking status: Never   • Smokeless tobacco: Never   Vaping Use   • Vaping Use: Never used   Substance and Sexual Activity   • Alcohol use: Not Currently   • Drug use: Never   • Sexual activity: None   Other Topics Concern   • None   Social History Narrative   • None     Social Determinants of Health     Financial Resource Strain: Not on file   Food Insecurity: Not on file   Transportation Needs: Not on file   Physical Activity: Not on file   Stress: Not on file   Social Connections: Not on file   Intimate Partner Violence: Not on file   Housing Stability: Not on file       Current Outpatient Medications:   •  rivaroxaban (Xarelto) 20 mg tablet, Take 1 tablet (20 mg total) by mouth daily with breakfast, Disp: 90 tablet, Rfl: 2  •  apixaban (ELIQUIS) 5 mg, Take 5 mg by mouth 2 (two) times a day (Patient not taking: Reported on 3/21/2023), Disp: , Rfl:   No Known Allergies    Objective   /62 (BP Location: Left arm, Patient Position: Sitting, Cuff Size: Adult)   Pulse 58   Temp 97 9 °F (36 6 °C) (Tympanic)   Resp 18   Ht 5' 9" (1 753 m)   Wt 108 kg (238 lb 12 8 oz)   SpO2 99%   BMI 35 26 kg/m²    Physical Exam  Constitutional:       General: He is not in acute distress  Appearance: He is well-developed  HENT:      Head: Normocephalic and atraumatic  Mouth/Throat:      Pharynx: No oropharyngeal exudate  Eyes:      General: No scleral icterus       Conjunctiva/sclera: Conjunctivae normal       Pupils: Pupils are equal, round, and reactive to light  Cardiovascular:      Rate and Rhythm: Normal rate and regular rhythm  Heart sounds: No murmur heard  Pulmonary:      Effort: Pulmonary effort is normal  No respiratory distress  Breath sounds: Normal breath sounds  Abdominal:      General: Bowel sounds are normal  There is no distension  Palpations: Abdomen is soft  Tenderness: There is no abdominal tenderness  Musculoskeletal:         General: No tenderness  Cervical back: Normal range of motion  Lymphadenopathy:      Cervical: No cervical adenopathy  Skin:     Coloration: Skin is not pale  Findings: No erythema or rash  Neurological:      Mental Status: He is alert and oriented to person, place, and time  Cranial Nerves: No cranial nerve deficit  Result Review  Labs:  Appointment on 03/06/2023   Component Date Value Ref Range Status   • D-Dimer, Quant 03/06/2023 0 32  <0 50 ug/ml FEU Final    Reference and upper limits to exclude DVT and PE are the same  Do not use to exclude if clinical symptoms are present  • Factor VIII Activity 03/06/2023 179 (H)  56 - 140 % Final    FVIII activity can increase in a variety of clinical situations  including normal pregnancy, in samples drawn from patients  (particularly children) who are visibly stressed at the time of  phlebotomy, as acute phase reactants, or in response to certain drug  therapies such as DDAVP  Persistently elevated FVIII activity is a  risk factor for venous thrombosis as well as recurrence of venous  thrombosis  Risk is graded and increases with the degree of  elevation  Although elevated FVIII activity has been identified to  cluster within families, a genetic basis for the elevation has not  yet been elucidated (Br J Haematol  2012; [phone])     • AntiThrombIN III Activity 03/06/2023 90 (L)  92 - 136 % of Normal Final   • ANTICARDIOLIPIN IGG ANTIBODY 03/06/2023 2 9  See comment Final   • ANTICARDIOLIPIN IGA ANTIBODY 03/06/2023 1 8  See comment Final   • ANTICARDIOLIPIN IGM ANTIBODY 03/06/2023 <0 9  See comment Final   • Factor V Leiden 03/06/2023 See Written Report   Final   • PTT Lupus Anticoagulant 03/06/2023 41 1  0 0 - 43 5 sec Final   • Dilute Viper Venom Time 03/06/2023 41 0  0 0 - 47 0 sec Final   • DILUTE PROTHROMBIN TIME(DPT) 03/06/2023 43 4  0 0 - 47 6 sec Final   • THROMBIN TIME (DRVW) 03/06/2023 17 7  0 0 - 23 0 sec Final   • DPT CONFIRM RATIO 03/06/2023 1 09  0 00 - 1 34 Ratio Final   • LUPUS REFLEX INTERPRETATION 03/06/2023 Comment:   Final    No lupus anticoagulant was detected  • Protein C Activity 03/06/2023 131  60 - 150 % of Normal Final   • PROTEIN S ACTIVITY 03/06/2023 77  71 - 117 % Final   • Protein S Ag, Total 03/06/2023 91  60 - 150 % Final    This test was developed and its performance characteristics  determined by [facility]  It has not been cleared or approved  by the Food and Drug Administration  • Protein S Ag, Free 03/06/2023 88  61 - 136 % Final   • Prothrombin Mutation 03/06/2023 SEE WRITTEN REPORT   Final   • Prothrombin Gene Mutation Interp 03/06/2023 SEE WRITTEN REPORT   Final   • BETA 2 GLYCO 1 IGG 03/06/2023 1 2  See comment Final   • BETA 2 GLYCO 1 IGA 03/06/2023 1 6  See comment Final   • BETA 2 GLYCO 1 IGM 03/06/2023 <2 4  See comment Final       Please note: This report has been generated by a voice recognition software system  Therefore there may be syntax, spelling, and/or grammatical errors  Please call if you have any questions

## 2023-06-14 ENCOUNTER — TELEPHONE (OUTPATIENT)
Dept: FAMILY MEDICINE CLINIC | Facility: CLINIC | Age: 68
End: 2023-06-14

## 2023-06-14 NOTE — TELEPHONE ENCOUNTER
Left message on machine for patient to call office  Patient is due for Annual Wellness Visit    Renay Lee

## 2023-06-28 ENCOUNTER — RA CDI HCC (OUTPATIENT)
Dept: OTHER | Facility: HOSPITAL | Age: 68
End: 2023-06-28

## 2023-06-28 NOTE — PROGRESS NOTES
[address]  coding opportunities       Chart reviewed, no opportunity found: CHART REVIEWED, NO OPPORTUNITY FOUND        Patients Insurance     Medicare Insurance: Medicare

## 2023-07-06 ENCOUNTER — OFFICE VISIT (OUTPATIENT)
Dept: FAMILY MEDICINE CLINIC | Facility: CLINIC | Age: 68
End: 2023-07-06
Payer: MEDICARE

## 2023-07-06 VITALS
HEART RATE: 57 BPM | WEIGHT: 228 LBS | BODY MASS INDEX: 33.77 KG/M2 | RESPIRATION RATE: 16 BRPM | OXYGEN SATURATION: 98 % | HEIGHT: 69 IN | DIASTOLIC BLOOD PRESSURE: 60 MMHG | SYSTOLIC BLOOD PRESSURE: 110 MMHG | TEMPERATURE: 97.5 F

## 2023-07-06 DIAGNOSIS — Z11.59 ENCOUNTER FOR HEPATITIS C SCREENING TEST FOR LOW RISK PATIENT: ICD-10-CM

## 2023-07-06 DIAGNOSIS — Z12.5 PROSTATE CANCER SCREENING: ICD-10-CM

## 2023-07-06 DIAGNOSIS — Z23 NEED FOR VACCINATION: ICD-10-CM

## 2023-07-06 DIAGNOSIS — Z00.00 MEDICARE ANNUAL WELLNESS VISIT, SUBSEQUENT: Primary | ICD-10-CM

## 2023-07-06 DIAGNOSIS — I26.99 ACUTE PULMONARY EMBOLISM WITHOUT ACUTE COR PULMONALE, UNSPECIFIED PULMONARY EMBOLISM TYPE (HCC): ICD-10-CM

## 2023-07-06 DIAGNOSIS — E78.2 MIXED HYPERLIPIDEMIA: ICD-10-CM

## 2023-07-06 PROBLEM — R59.1 LYMPHADENOPATHY: Status: RESOLVED | Noted: 2021-11-15 | Resolved: 2023-07-06

## 2023-07-06 PROBLEM — J18.1 LEFT LOWER LOBE CONSOLIDATION (HCC): Status: RESOLVED | Noted: 2021-11-15 | Resolved: 2023-07-06

## 2023-07-06 PROBLEM — E66.09 CLASS 2 OBESITY DUE TO EXCESS CALORIES WITHOUT SERIOUS COMORBIDITY WITH BODY MASS INDEX (BMI) OF 38.0 TO 38.9 IN ADULT: Status: RESOLVED | Noted: 2021-12-01 | Resolved: 2023-07-06

## 2023-07-06 PROBLEM — E66.812 CLASS 2 OBESITY DUE TO EXCESS CALORIES WITHOUT SERIOUS COMORBIDITY WITH BODY MASS INDEX (BMI) OF 38.0 TO 38.9 IN ADULT: Status: RESOLVED | Noted: 2021-12-01 | Resolved: 2023-07-06

## 2023-07-06 PROCEDURE — G0439 PPPS, SUBSEQ VISIT: HCPCS | Performed by: INTERNAL MEDICINE

## 2023-07-06 PROCEDURE — 90677 PCV20 VACCINE IM: CPT

## 2023-07-06 PROCEDURE — G0009 ADMIN PNEUMOCOCCAL VACCINE: HCPCS

## 2023-07-06 NOTE — PATIENT INSTRUCTIONS
Medicare Preventive Visit Patient Instructions  Thank you for completing your Welcome to Medicare Visit or Medicare Annual Wellness Visit today. Your next wellness visit will be due in one year (7/6/2024). The screening/preventive services that you may require over the next 5-10 years are detailed below. Some tests may not apply to you based off risk factors and/or age. Screening tests ordered at today's visit but not completed yet may show as past due. Also, please note that scanned in results may not display below. Preventive Screenings:  Service Recommendations Previous Testing/Comments   Colorectal Cancer Screening  · Colonoscopy    · Fecal Occult Blood Test (FOBT)/Fecal Immunochemical Test (FIT)  · Fecal DNA/Cologuard Test  · Flexible Sigmoidoscopy Age: 43-73 years old   Colonoscopy: every 10 years (May be performed more frequently if at higher risk)  OR  FOBT/FIT: every 1 year  OR  Cologuard: every 3 years  OR  Sigmoidoscopy: every 5 years  Screening may be recommended earlier than age 39 if at higher risk for colorectal cancer. Also, an individualized decision between you and your healthcare provider will decide whether screening between the ages of 77-80 would be appropriate.  Colonoscopy: 12/09/2022  FOBT/FIT: Not on file  Cologuard: Not on file  Sigmoidoscopy: Not on file    Screening Current     Prostate Cancer Screening Individualized decision between patient and health care provider in men between ages of 53-66   Medicare will cover every 12 months beginning on the day after your 50th birthday PSA: 2.0 ng/mL           Hepatitis C Screening Once for adults born between 1945 and 1965  More frequently in patients at high risk for Hepatitis C Hep C Antibody: Not on file        Diabetes Screening 1-2 times per year if you're at risk for diabetes or have pre-diabetes Fasting glucose: 90 mg/dL (2/16/2022)  A1C: No results in last 5 years (No results in last 5 years)      Cholesterol Screening Once every 5 years if you don't have a lipid disorder. May order more often based on risk factors. Lipid panel: 02/16/2022  Screening Current      Other Preventive Screenings Covered by Medicare:  1. Abdominal Aortic Aneurysm (AAA) Screening: covered once if your at risk. You're considered to be at risk if you have a family history of AAA or a male between the age of 70-76 who smoking at least 100 cigarettes in your lifetime. 2. Lung Cancer Screening: covers low dose CT scan once per year if you meet all of the following conditions: (1) Age 48-67; (2) No signs or symptoms of lung cancer; (3) Current smoker or have quit smoking within the last 15 years; (4) You have a tobacco smoking history of at least 20 pack years (packs per day x number of years you smoked); (5) You get a written order from a healthcare provider. 3. Glaucoma Screening: covered annually if you're considered high risk: (1) You have diabetes OR (2) Family history of glaucoma OR (3)  aged 48 and older OR (3)  American aged 72 and older  3. Osteoporosis Screening: covered every 2 years if you meet one of the following conditions: (1) Have a vertebral abnormality; (2) On glucocorticoid therapy for more than 3 months; (3) Have primary hyperparathyroidism; (4) On osteoporosis medications and need to assess response to drug therapy. 5. HIV Screening: covered annually if you're between the age of 14-79. Also covered annually if you are younger than 13 and older than 72 with risk factors for HIV infection. For pregnant patients, it is covered up to 3 times per pregnancy.     Immunizations:  Immunization Recommendations   Influenza Vaccine Annual influenza vaccination during flu season is recommended for all persons aged >= 6 months who do not have contraindications   Pneumococcal Vaccine   * Pneumococcal conjugate vaccine = PCV13 (Prevnar 13), PCV15 (Vaxneuvance), PCV20 (Prevnar 20)  * Pneumococcal polysaccharide vaccine = PPSV23 (Pneumovax) Adults 20-63 years old: 1-3 doses may be recommended based on certain risk factors  Adults 72 years old: 1-2 doses may be recommended based off what pneumonia vaccine you previously received   Hepatitis B Vaccine 3 dose series if at intermediate or high risk (ex: diabetes, end stage renal disease, liver disease)   Tetanus (Td) Vaccine - COST NOT COVERED BY MEDICARE PART B Following completion of primary series, a booster dose should be given every 10 years to maintain immunity against tetanus. Td may also be given as tetanus wound prophylaxis. Tdap Vaccine - COST NOT COVERED BY MEDICARE PART B Recommended at least once for all adults. For pregnant patients, recommended with each pregnancy. Shingles Vaccine (Shingrix) - COST NOT COVERED BY MEDICARE PART B  2 shot series recommended in those aged 48 and above     Health Maintenance Due:      Topic Date Due   • Hepatitis C Screening  Never done   • Colorectal Cancer Screening  12/08/2027     Immunizations Due:      Topic Date Due   • COVID-19 Vaccine (1) Never done   • Pneumococcal Vaccine: 65+ Years (1 - PCV) Never done   • Influenza Vaccine (1) 09/01/2023     Advance Directives   What are advance directives? Advance directives are legal documents that state your wishes and plans for medical care. These plans are made ahead of time in case you lose your ability to make decisions for yourself. Advance directives can apply to any medical decision, such as the treatments you want, and if you want to donate organs. What are the types of advance directives? There are many types of advance directives, and each state has rules about how to use them. You may choose a combination of any of the following:  · Living will: This is a written record of the treatment you want. You can also choose which treatments you do not want, which to limit, and which to stop at a certain time. This includes surgery, medicine, IV fluid, and tube feedings.    · Durable power of  [facility]): This is a written record that states who you want to make healthcare choices for you when you are unable to make them for yourself. This person, called a proxy, is usually a family member or a friend. You may choose more than 1 proxy. · Do not resuscitate (DNR) order:  A DNR order is used in case your heart stops beating or you stop breathing. It is a request not to have certain forms of treatment, such as CPR. A DNR order may be included in other types of advance directives. · Medical directive: This covers the care that you want if you are in a coma, near death, or unable to make decisions for yourself. You can list the treatments you want for each condition. Treatment may include pain medicine, surgery, blood transfusions, dialysis, IV or tube feedings, and a ventilator (breathing machine). · Values history: This document has questions about your views, beliefs, and how you feel and think about life. This information can help others choose the care that you would choose. Why are advance directives important? An advance directive helps you control your care. Although spoken wishes may be used, it is better to have your wishes written down. Spoken wishes can be misunderstood, or not followed. Treatments may be given even if you do not want them. An advance directive may make it easier for your family to make difficult choices about your care. Weight Management   Why it is important to manage your weight:  Being overweight increases your risk of health conditions such as heart disease, high blood pressure, type 2 diabetes, and certain types of cancer. It can also increase your risk for osteoarthritis, sleep apnea, and other respiratory problems. Aim for a slow, steady weight loss. Even a small amount of weight loss can lower your risk of health problems. How to lose weight safely:  A safe and healthy way to lose weight is to eat fewer calories and get regular exercise.  You can lose up about 1 pound a week by decreasing the number of calories you eat by 500 calories each day. Healthy meal plan for weight management:  A healthy meal plan includes a variety of foods, contains fewer calories, and helps you stay healthy. A healthy meal plan includes the following:  · Eat whole-grain foods more often. A healthy meal plan should contain fiber. Fiber is the part of grains, fruits, and vegetables that is not broken down by your body. Whole-grain foods are healthy and provide extra fiber in your diet. Some examples of whole-grain foods are whole-wheat breads and pastas, oatmeal, brown rice, and bulgur. · Eat a variety of vegetables every day. Include dark, leafy greens such as spinach, kale, moe greens, and mustard greens. Eat yellow and orange vegetables such as carrots, sweet potatoes, and winter squash. · Eat a variety of fruits every day. Choose fresh or canned fruit (canned in its own juice or light syrup) instead of juice. Fruit juice has very little or no fiber. · Eat low-fat dairy foods. Drink fat-free (skim) milk or 1% milk. Eat fat-free yogurt and low-fat cottage cheese. Try low-fat cheeses such as mozzarella and other reduced-fat cheeses. · Choose meat and other protein foods that are low in fat. Choose beans or other legumes such as split peas or lentils. Choose fish, skinless poultry (chicken or turkey), or lean cuts of red meat (beef or pork). Before you cook meat or poultry, cut off any visible fat. · Use less fat and oil. Try baking foods instead of frying them. Add less fat, such as margarine, sour cream, regular salad dressing and mayonnaise to foods. Eat fewer high-fat foods. Some examples of high-fat foods include french fries, doughnuts, ice cream, and cakes. · Eat fewer sweets. Limit foods and drinks that are high in sugar. This includes candy, cookies, regular soda, and sweetened drinks.   Exercise:  Exercise at least 30 minutes per day on most days of the week. Some examples of exercise include walking, biking, dancing, and swimming. You can also fit in more physical activity by taking the stairs instead of the elevator or parking farther away from stores. Ask your healthcare provider about the best exercise plan for you. © Copyright [address] 2018 Information is for End User's use only and may not be sold, redistributed or otherwise used for commercial purposes.  All illustrations and images included in CareNotes® are the copyrighted property of [employer] or [address] [place] [address]

## 2023-07-06 NOTE — PROGRESS NOTES
Assessment and Plan:     Problem List Items Addressed This Visit        Cardiovascular and Mediastinum    Pulmonary embolism ([address])   Other Visit Diagnoses     Medicare annual wellness visit, subsequent    -  Primary    Encounter for hepatitis C screening test for low risk patient        Relevant Orders    Hepatitis C antibody    Prostate cancer screening        Relevant Orders    PSA, Total Screen    Mixed hyperlipidemia        Relevant Orders    CBC and differential    Comprehensive metabolic panel    Lipid panel    Need for vaccination        Relevant Orders    Pneumococcal Conjugate Vaccine 20-valent (Pcv20) (Completed)           Preventive health issues were discussed with patient, and age appropriate screening tests were ordered as noted in patient's After Visit Summary. Personalized health advice and appropriate referrals for health education or preventive services given if needed, as noted in patient's After Visit Summary. History of Present Illness:     Patient presents for a Medicare Wellness Visit    Here for AWV. Patient Care Team:  Mindy Mejias MD as PCP - General (Internal Medicine)     Review of Systems:     Review of Systems   Constitutional: Negative. HENT: Negative. Eyes: Negative. Respiratory: Negative. Cardiovascular: Negative. Gastrointestinal: Negative. Endocrine: Negative. Genitourinary: Negative. Musculoskeletal: Negative. Skin: Negative. Allergic/Immunologic: Negative. Neurological: Negative. Hematological: Negative. Psychiatric/Behavioral: Negative.          Problem List:     Patient Active Problem List   Diagnosis   • Pulmonary embolism (HCC)   • Acute deep vein thrombosis (DVT) of calf muscle vein of right lower extremity [facility])      Past Medical and Surgical History:     Past Medical History:   Diagnosis Date   • DVT (deep venous thrombosis) ([address])    • Pneumonia    • Pulmonary embolism [facility])      Past Surgical History:   Procedure Laterality Date   • EYE SURGERY Bilateral     cataracts with IOL   • ORTHOPEDIC SURGERY Left     4 th finger - tendon reattached at age 26   • TONSILLECTOMY        Family History:     Family History   Problem Relation Age of Onset   • Multiple myeloma Mother    • Vascular Disease Father    • Heart disease Father    • Colon cancer Father    • No Known Problems Brother       Social History:     Social History     Socioeconomic History   • Marital status: /Civil Union     Spouse name: None   • Number of children: None   • Years of education: None   • Highest education level: None   Occupational History   • None   Tobacco Use   • Smoking status: Never     Passive exposure: Never   • Smokeless tobacco: Never   Vaping Use   • Vaping Use: Never used   Substance and Sexual Activity   • Alcohol use: Not Currently   • Drug use: Never   • Sexual activity: None   Other Topics Concern   • None   Social History Narrative   • None     Social Determinants of Health     Financial Resource Strain: Unknown (7/6/2023)    Overall Financial Resource Strain (CARDIA)    • Difficulty of Paying Living Expenses: Patient refused   Food Insecurity: Not on file   Transportation Needs: Unknown (7/6/2023)    [facility] Transportation    • Lack of Transportation (Medical): Patient refused    • Lack of Transportation (Non-Medical): Patient refused   Physical Activity: Not on file   Stress: Not on file   Social Connections: Not on file   Intimate Partner Violence: Not on file   Housing Stability: Not on file      Medications and Allergies:     Current Outpatient Medications   Medication Sig Dispense Refill   • rivaroxaban (Xarelto) 20 mg tablet Take 1 tablet (20 mg total) by mouth daily with breakfast 90 tablet 2     No current facility-administered medications for this visit.      No Known Allergies   Immunizations:     Immunization History   Administered Date(s) Administered   • Pneumococcal Conjugate Vaccine 20-valent (Pcv20), Polysace 07/06/2023      Health Maintenance:         Topic Date Due   • Hepatitis C Screening  Never done   • Colorectal Cancer Screening  12/08/2027         Topic Date Due   • COVID-19 Vaccine (1) Never done   • Influenza Vaccine (1) 09/01/2023      Medicare Screening Tests and Risk Assessments:     Mignon Wu is here for his Subsequent Wellness visit. Health Risk Assessment:   Patient rates overall health as good. Patient feels that their physical health rating is same. Patient is very satisfied with their life. Eyesight was rated as same. Hearing was rated as same. Patient feels that their emotional and mental health rating is same. Patients states they are never, rarely angry. Patient states they are never, rarely unusually tired/fatigued. Pain experienced in the last 7 days has been none. Patient states that he has experienced no weight loss or gain in last 6 months. Depression Screening:   PHQ-2 Score: 0      Fall Risk Screening: In the past year, patient has experienced: no history of falling in past year      Home Safety:  Patient does not have trouble with stairs inside or outside of their home. Patient has no working smoke alarms and has no working carbon monoxide detector. Home safety hazards include: none. Nutrition:   Current diet is Regular. Medications:   Patient is not currently taking any over-the-counter supplements. Patient is able to manage medications. Activities of Daily Living (ADLs)/Instrumental Activities of Daily Living (IADLs):   Walk and transfer into and out of bed and chair?: Yes  Dress and groom yourself?: Yes    Bathe or shower yourself?: Yes    Feed yourself?  Yes  Do your laundry/housekeeping?: Yes  Manage your money, pay your bills and track your expenses?: Yes  Make your own meals?: Yes    Do your own shopping?: Yes    Previous Hospitalizations:   Any hospitalizations or ED visits within the last 12 months?: No      Advance Care Planning:   Living will: No      Comments: Declines information. Cognitive Screening:   Provider or family/friend/caregiver concerned regarding cognition?: No    PREVENTIVE SCREENINGS      Cardiovascular Screening:    General: Screening Current and Risks and Benefits Discussed    Due for: Lipid Panel      Diabetes Screening:     General: Risks and Benefits Discussed    Due for: Blood Glucose      Colorectal Cancer Screening:     General: Screening Current      Prostate Cancer Screening:    General: Risks and Benefits Discussed    Due for: PSA      Osteoporosis Screening:    General: Screening Not Indicated      Abdominal Aortic Aneurysm (AAA) Screening:    Risk factors include: age between 70-75 yo        Lung Cancer Screening:     General: Screening Not Indicated      Hepatitis C Screening:    General: Risks and Benefits Discussed    Hep C Screening Accepted: Yes      Screening, Brief Intervention, and Referral to Treatment (SBIRT)    Screening      AUDIT-C Screenin) How often did you have a drink containing alcohol in the past year? monthly or less  2) How many drinks did you have on a typical day when you were drinking in the past year? 1 to 2  3) How often did you have 6 or more drinks on one occasion in the past year? never    AUDIT-C Score: 1  Interpretation: Score 0-3 (male): Negative screen for alcohol misuse    Single Item Drug Screening:  How often have you used an illegal drug (including marijuana) or a prescription medication for non-medical reasons in the past year? never    Single Item Drug Screen Score: 0  Interpretation: Negative screen for possible drug use disorder    No results found. Physical Exam:     /60 (BP Location: Left arm, Patient Position: Sitting, Cuff Size: Large)   Pulse 57   Temp 97.5 °F (36.4 °C) (Temporal)   Resp 16   Ht 5' 9" (1.753 m)   Wt 103 kg (228 lb)   SpO2 98%   BMI 33.67 kg/m²     Physical Exam  Vitals and nursing note reviewed. Constitutional:       General: He is not in acute distress. Appearance: He is well-developed. HENT:      Head: Normocephalic and atraumatic. Eyes:      Conjunctiva/sclera: Conjunctivae normal.   Cardiovascular:      Rate and Rhythm: Normal rate and regular rhythm. Heart sounds: No murmur heard. Pulmonary:      Effort: Pulmonary effort is normal. No respiratory distress. Breath sounds: Normal breath sounds. Abdominal:      Palpations: Abdomen is soft. Tenderness: There is no abdominal tenderness. Musculoskeletal:         General: No swelling. Cervical back: Neck supple. Skin:     General: Skin is warm and dry. Capillary Refill: Capillary refill takes less than 2 seconds. Neurological:      Mental Status: He is alert.    Psychiatric:         Mood and Affect: Mood normal.          Shanthi Zamora MD

## 2023-09-04 DIAGNOSIS — I82.403 RECURRENT ACUTE DEEP VEIN THROMBOSIS (DVT) OF BOTH LOWER EXTREMITIES (HCC): ICD-10-CM

## 2023-09-04 DIAGNOSIS — Z86.711 HISTORY OF PULMONARY EMBOLISM: ICD-10-CM

## 2023-09-05 RX ORDER — RIVAROXABAN 20 MG/1
20 TABLET, FILM COATED ORAL
Qty: 90 TABLET | Refills: 2 | Status: SHIPPED | OUTPATIENT
Start: 2023-09-05

## 2023-11-01 ENCOUNTER — APPOINTMENT (OUTPATIENT)
Dept: LAB | Facility: CLINIC | Age: 68
End: 2023-11-01
Payer: MEDICARE

## 2023-11-01 DIAGNOSIS — Z12.5 PROSTATE CANCER SCREENING: ICD-10-CM

## 2023-11-01 DIAGNOSIS — Z11.59 ENCOUNTER FOR HEPATITIS C SCREENING TEST FOR LOW RISK PATIENT: ICD-10-CM

## 2023-11-01 DIAGNOSIS — E78.2 MIXED HYPERLIPIDEMIA: ICD-10-CM

## 2023-11-01 LAB
ALBUMIN SERPL BCP-MCNC: 3.9 G/DL (ref 3.5–5)
ALP SERPL-CCNC: 57 U/L (ref 34–104)
ALT SERPL W P-5'-P-CCNC: 14 U/L (ref 7–52)
ANION GAP SERPL CALCULATED.3IONS-SCNC: 6 MMOL/L
AST SERPL W P-5'-P-CCNC: 16 U/L (ref 13–39)
BASOPHILS # BLD AUTO: 0.05 THOUSANDS/ÂΜL (ref 0–0.1)
BASOPHILS NFR BLD AUTO: 1 % (ref 0–1)
BILIRUB SERPL-MCNC: 0.44 MG/DL (ref 0.2–1)
BUN SERPL-MCNC: 15 MG/DL (ref 5–25)
CALCIUM SERPL-MCNC: 8.9 MG/DL (ref 8.4–10.2)
CHLORIDE SERPL-SCNC: 107 MMOL/L (ref 96–108)
CHOLEST SERPL-MCNC: 206 MG/DL
CO2 SERPL-SCNC: 30 MMOL/L (ref 21–32)
CREAT SERPL-MCNC: 0.87 MG/DL (ref 0.6–1.3)
EOSINOPHIL # BLD AUTO: 0.39 THOUSAND/ÂΜL (ref 0–0.61)
EOSINOPHIL NFR BLD AUTO: 7 % (ref 0–6)
ERYTHROCYTE [DISTWIDTH] IN BLOOD BY AUTOMATED COUNT: 12.3 % (ref 11.6–15.1)
GFR SERPL CREATININE-BSD FRML MDRD: 89 ML/MIN/1.73SQ M
GLUCOSE P FAST SERPL-MCNC: 88 MG/DL (ref 65–99)
HCT VFR BLD AUTO: 46.1 % (ref 36.5–49.3)
HCV AB SER QL: NORMAL
HDLC SERPL-MCNC: 54 MG/DL
HGB BLD-MCNC: 16.4 G/DL (ref 12–17)
IMM GRANULOCYTES # BLD AUTO: 0.03 THOUSAND/UL (ref 0–0.2)
IMM GRANULOCYTES NFR BLD AUTO: 1 % (ref 0–2)
LDLC SERPL CALC-MCNC: 140 MG/DL (ref 0–100)
LYMPHOCYTES # BLD AUTO: 1.82 THOUSANDS/ÂΜL (ref 0.6–4.47)
LYMPHOCYTES NFR BLD AUTO: 34 % (ref 14–44)
MCH RBC QN AUTO: 33.4 PG (ref 26.8–34.3)
MCHC RBC AUTO-ENTMCNC: 35.6 G/DL (ref 31.4–37.4)
MCV RBC AUTO: 94 FL (ref 82–98)
MONOCYTES # BLD AUTO: 0.4 THOUSAND/ÂΜL (ref 0.17–1.22)
MONOCYTES NFR BLD AUTO: 7 % (ref 4–12)
NEUTROPHILS # BLD AUTO: 2.7 THOUSANDS/ÂΜL (ref 1.85–7.62)
NEUTS SEG NFR BLD AUTO: 50 % (ref 43–75)
NONHDLC SERPL-MCNC: 152 MG/DL
NRBC BLD AUTO-RTO: 0 /100 WBCS
PLATELET # BLD AUTO: 193 THOUSANDS/UL (ref 149–390)
PMV BLD AUTO: 9.8 FL (ref 8.9–12.7)
POTASSIUM SERPL-SCNC: 3.9 MMOL/L (ref 3.5–5.3)
PROT SERPL-MCNC: 6.6 G/DL (ref 6.4–8.4)
PSA SERPL-MCNC: 3.16 NG/ML (ref 0–4)
RBC # BLD AUTO: 4.91 MILLION/UL (ref 3.88–5.62)
SODIUM SERPL-SCNC: 143 MMOL/L (ref 135–147)
TRIGL SERPL-MCNC: 58 MG/DL
WBC # BLD AUTO: 5.39 THOUSAND/UL (ref 4.31–10.16)

## 2023-11-01 PROCEDURE — 85025 COMPLETE CBC W/AUTO DIFF WBC: CPT

## 2023-11-01 PROCEDURE — 86803 HEPATITIS C AB TEST: CPT

## 2023-11-01 PROCEDURE — 36415 COLL VENOUS BLD VENIPUNCTURE: CPT

## 2023-11-01 PROCEDURE — G0103 PSA SCREENING: HCPCS

## 2023-11-01 PROCEDURE — 80053 COMPREHEN METABOLIC PANEL: CPT

## 2023-11-01 PROCEDURE — 80061 LIPID PANEL: CPT

## 2023-11-10 ENCOUNTER — OFFICE VISIT (OUTPATIENT)
Dept: HEMATOLOGY ONCOLOGY | Facility: CLINIC | Age: 68
End: 2023-11-10
Payer: MEDICARE

## 2023-11-10 VITALS
WEIGHT: 232 LBS | OXYGEN SATURATION: 98 % | DIASTOLIC BLOOD PRESSURE: 60 MMHG | HEART RATE: 63 BPM | HEIGHT: 69 IN | TEMPERATURE: 97.9 F | BODY MASS INDEX: 34.36 KG/M2 | SYSTOLIC BLOOD PRESSURE: 118 MMHG

## 2023-11-10 DIAGNOSIS — I82.403 RECURRENT ACUTE DEEP VEIN THROMBOSIS (DVT) OF BOTH LOWER EXTREMITIES (HCC): Primary | ICD-10-CM

## 2023-11-10 DIAGNOSIS — Z86.711 HISTORY OF PULMONARY EMBOLISM: ICD-10-CM

## 2023-11-10 PROCEDURE — 99213 OFFICE O/P EST LOW 20 MIN: CPT | Performed by: PHYSICIAN ASSISTANT

## 2023-11-10 NOTE — PATIENT INSTRUCTIONS
[facility] Hematology Team  [facility] AT [place] - [phone]    Your Team Members:  Advanced Practitioner:  Eladio eNwberry PA-C  Oncology Nurse:   Michele Toney RN ([phone]) M-F 8am - 4:30pm    Please answer Private and Unavailable Calls - this may be your team(s) contacting you.   If you have medical questions/concerns/issues - contact us either by (1) My Chart (2) Hope Line       Eliquis 5 mg twice daily  Xarelto 20 mg daily  Pradaxa 150 mg twice day

## 2023-11-10 NOTE — PROGRESS NOTES
[place] [id number]  Hematology Ambulatory Follow-Up  Kristopher Calderon, 1955, [id number]  11/10/2023      Assessment and Plan   1. Recurrent acute deep vein thrombosis (DVT) of both lower extremities (HCC);  2. History of pulmonary embolism  Is a 81-year-old male with past medical history of DVT in November 2020 of the right lower extremity with a second episode in August 2022 of the left lower extremity with near occlusive disease. Hypercoagulable work-up was negative for antiphospholipid antibodies and therefore the patient is safe to continue on NOAC. Previous discussions regarding discharge from the practice and following up with PCP were revisited. Mr. Chacha Baker will follow-up with Dr. Ron Durham for further refills. I am still available for assistance if she should needed regarding transitioning medications or if formulary was changed to discuss dosage of a different anticoagulation. Otherwise, patient will follow-up with primary care doctor. Regimen:  Xarelto 20 mg p.o. daily    The patient is scheduled for follow-up PRN. Patient voiced agreement and understanding to the above. Patient advised to call the Hematology/Oncology office with any questions and concerns regarding the above. Barrier(s) to care: None  The patient is able to self care. Rowan Tineo PA-C  Medical Oncology/Hematology  [facility]    Subjective     Chief Complaint   Patient presents with    Follow-up     History of present illness: This is a 81-year-old male with past medical history of obesity and PE DVT has been following with hematology since December 2021. First episode occurred in November of 2021:  Patient seen by me as a consultation in the hospital.  presented to the [facility] for evaluation of right lower leg pain and swelling for a week.   Patient went to urgent care who was then referred to the emergency room for further testing. X-ray of the tib-fib was negative for acute osseous abnormality. Venous Doppler demonstrated a right lower extremity occlusive deep vein thrombosis noted in the common femoral vein, femoral vein, popliteal vein and multiple calf veins. Left lower extremity was negative for thrombus. CTA of the chest demonstrated multiple bilateral segmental and subsegmental pulmonary emboli without evidence of heart strain. Patient had consolidative changes likely connected to previous pneumonia, prior CT scan was completed on 10/2/21. Patient also had subsegmental paratracheal lymph nodes possibly reactive due to bilateral segmental pulmonary emboli. Follow-up was recommended. Blood work was benign, COVID testing was negative. Patient's clinical course included therapeutic Lovenox, then transitioned to Eliquis for a period of six months. June 2022 patient underwent repeat CT of the chest as well as lower extremity Dopplers. Right lower extremity demonstrated a chronic nonocclusive deep vein thrombosis in the proximal femoral vein, popliteal vein and one of the gastrocnemius veins. Left was clear. CTA was negative for pulmonary embolism. Patient was taken off Eliquis. In August of 2022 patient underwent evaluation for left lower extremity pain. Evaluation shows no evidence of thrombus in the right common femoral vein. LEFT LOWER LIMB: Acute mostly occlusive deep vein thrombosis is noted in the proximal-distal femoral, popliteal, gastrocnemius and paired posterior tibial and peroneal veins. No evidence of superficial thrombophlebitis noted. Popliteal, posterior tibial and anterior tibial arterial Doppler waveforms are triphasic.     8/28/22:  Loading dose of Eliquis by Eliquis 5 mg twice a day was started, due to symtpoms. 11/2/2022: Insurance notification changes, patient will transition to [facility] in 2023.   Prescription provided. 3/6/2023 Thrombosis panel-Antithrombin III antibody 90% (L), factor VIII activity 179%, otherwise normal D-dimer, antiphospholipid antibodies(LA/glycoprotein/cardiolipin), protein C and protein S, and factor V Leiden and prothrombin gene mutations negative. 11/1/2023: WBC 5.3, hemoglobin 16.4, platelet count 499, creatinine 0.87, BUN 15, LFTs WNL    Interval history:  no formulary changes. Doing well, bruising as anticipated. Review of Systems   Constitutional:  Negative for activity change, appetite change, fatigue and fever. HENT:  Negative for nosebleeds. Respiratory:  Negative for cough, choking and shortness of breath. Cardiovascular:  Negative for chest pain, palpitations and leg swelling. Gastrointestinal:  Negative for abdominal distention, abdominal pain, anal bleeding, blood in stool, constipation, diarrhea, nausea and vomiting. Endocrine: Negative for cold intolerance. Genitourinary:  Negative for hematuria. Musculoskeletal:  Negative for myalgias. Skin:  Negative for color change, pallor and rash. Allergic/Immunologic: Negative for immunocompromised state. Neurological:  Negative for headaches. Hematological:  Negative for adenopathy. Does not bruise/bleed easily. All other systems reviewed and are negative.       Patient Active Problem List   Diagnosis    Pulmonary embolism (HCC)    Acute deep vein thrombosis (DVT) of calf muscle vein of right lower extremity (HCC)     Past Medical History:   Diagnosis Date    DVT (deep venous thrombosis) (HCC)     Pneumonia     Pulmonary embolism (HCC)      Past Surgical History:   Procedure Laterality Date    EYE SURGERY Bilateral     cataracts with IOL    ORTHOPEDIC SURGERY Left     4 th finger - tendon reattached at age 26    TONSILLECTOMY       Family History   Problem Relation Age of Onset    Multiple myeloma Mother     Vascular Disease Father     Heart disease Father     Colon cancer Father     No Known Problems Brother      Social History     Socioeconomic History    Marital status: /Civil Union     Spouse name: None    Number of children: None    Years of education: None    Highest education level: None   Occupational History    None   Tobacco Use    Smoking status: Never     Passive exposure: Never    Smokeless tobacco: Never   Vaping Use    Vaping Use: Never used   Substance and Sexual Activity    Alcohol use: Not Currently    Drug use: Never    Sexual activity: None   Other Topics Concern    None   Social History Narrative    None     Social Determinants of Health     Financial Resource Strain: Unknown (7/6/2023)    Overall Financial Resource Strain (CARDIA)     Difficulty of Paying Living Expenses: Patient refused   Food Insecurity: Not on file   Transportation Needs: Unknown (7/6/2023)    PRAPARE - Transportation     Lack of Transportation (Medical): Patient refused     Lack of Transportation (Non-Medical): Patient refused   Physical Activity: Not on file   Stress: Not on file   Social Connections: Not on file   Intimate Partner Violence: Not on file   Housing Stability: Not on file       Current Outpatient Medications:     Xarelto 20 MG tablet, TAKE 1 TABLET DAILY WITH   BREAKFAST, Disp: 90 tablet, Rfl: 2  No Known Allergies    Objective   /60   Pulse 63   Temp 97.9 °F (36.6 °C)   Ht 5' 9" (1.753 m)   Wt 105 kg (232 lb)   SpO2 98%   BMI 34.26 kg/m²    Physical Exam  Constitutional:       General: He is not in acute distress. Appearance: He is well-developed. HENT:      Head: Normocephalic and atraumatic. Right Ear: External ear normal.      Left Ear: External ear normal.      Nose: Nose normal.   Eyes:      General: No scleral icterus. Conjunctiva/sclera: Conjunctivae normal.   Cardiovascular:      Rate and Rhythm: Normal rate. Pulmonary:      Effort: No respiratory distress. Skin:     Findings: No rash (on exposed skin. ).    Neurological:      Mental Status: He is alert and oriented to person, place, and time. Psychiatric:         Thought Content:  Thought content normal.         Result Review  Labs:  Appointment on 11/01/2023   Component Date Value Ref Range Status    WBC 11/01/2023 5.39  4.31 - 10.16 Thousand/uL Final    RBC 11/01/2023 4.91  3.88 - 5.62 Million/uL Final    Hemoglobin 11/01/2023 16.4  12.0 - 17.0 g/dL Final    Hematocrit 11/01/2023 46.1  36.5 - 49.3 % Final    MCV 11/01/2023 94  82 - 98 fL Final    MCH 11/01/2023 33.4  26.8 - 34.3 pg Final    MCHC 11/01/2023 35.6  31.4 - 37.4 g/dL Final    RDW 11/01/2023 12.3  11.6 - 15.1 % Final    MPV 11/01/2023 9.8  8.9 - 12.7 fL Final    Platelets 33/08/0212 193  149 - 390 Thousands/uL Final    nRBC 11/01/2023 0  /100 WBCs Final    Neutrophils Relative 11/01/2023 50  43 - 75 % Final    Immat GRANS % 11/01/2023 1  0 - 2 % Final    Lymphocytes Relative 11/01/2023 34  14 - 44 % Final    Monocytes Relative 11/01/2023 7  4 - 12 % Final    Eosinophils Relative 11/01/2023 7 (H)  0 - 6 % Final    Basophils Relative 11/01/2023 1  0 - 1 % Final    Neutrophils Absolute 11/01/2023 2.70  1.85 - 7.62 Thousands/µL Final    Immature Grans Absolute 11/01/2023 0.03  0.00 - 0.20 Thousand/uL Final    Lymphocytes Absolute 11/01/2023 1.82  0.60 - 4.47 Thousands/µL Final    Monocytes Absolute 11/01/2023 0.40  0.17 - 1.22 Thousand/µL Final    Eosinophils Absolute 11/01/2023 0.39  0.00 - 0.61 Thousand/µL Final    Basophils Absolute 11/01/2023 0.05  0.00 - 0.10 Thousands/µL Final    Sodium 11/01/2023 143  135 - 147 mmol/L Final    Potassium 11/01/2023 3.9  3.5 - 5.3 mmol/L Final    Chloride 11/01/2023 107  96 - 108 mmol/L Final    CO2 11/01/2023 30  21 - 32 mmol/L Final    ANION GAP 11/01/2023 6  mmol/L Final    BUN 11/01/2023 15  5 - 25 mg/dL Final    Creatinine 11/01/2023 0.87  0.60 - 1.30 mg/dL Final    Standardized to IDMS reference method    Glucose, Fasting 11/01/2023 88  65 - 99 mg/dL Final    Calcium 11/01/2023 8.9  8.4 - 10.2 mg/dL Final    AST 11/01/2023 16  13 - 39 U/L Final    ALT 11/01/2023 14  7 - 52 U/L Final    Specimen collection should occur prior to Sulfasalazine administration due to the potential for falsely depressed results. Alkaline Phosphatase 11/01/2023 57  34 - 104 U/L Final    Total Protein 11/01/2023 6.6  6.4 - 8.4 g/dL Final    Albumin 11/01/2023 3.9  3.5 - 5.0 g/dL Final    Total Bilirubin 11/01/2023 0.44  0.20 - 1.00 mg/dL Final    Use of this assay is not recommended for patients undergoing treatment with eltrombopag due to the potential for falsely elevated results. N-acetyl-p-benzoquinone imine (metabolite of Acetaminophen) will generate erroneously low results in samples for patients that have taken an overdose of Acetaminophen. eGFR 11/01/2023 89  ml/min/1.73sq m Final    Cholesterol 11/01/2023 206 (H)  See Comment mg/dL Final    Cholesterol:         Pediatric <18 Years        Desirable          <170 mg/dL      Borderline High    170-199 mg/dL      High               >=200 mg/dL        Adult >=18 Years            Desirable         <200 mg/dL      Borderline High   200-239 mg/dL      High              >239 mg/dL      Triglycerides 11/01/2023 58  See Comment mg/dL Final    Triglyceride:     0-9Y            <75mg/dL     10Y-17Y         <90 mg/dL       >=18Y     Normal          <150 mg/dL     Borderline High 150-199 mg/dL     High            200-499 mg/dL        Very High       >499 mg/dL    Specimen collection should occur prior to Metamizole administration due to the potential for falsely depressed results. HDL, Direct 11/01/2023 54  >=40 mg/dL Final    LDL Calculated 11/01/2023 140 (H)  0 - 100 mg/dL Final    LDL Cholesterol:     Optimal           <100 mg/dl     Near Optimal      100-129 mg/dl     Above Optimal       Borderline High 130-159 mg/dl       High            160-189 mg/dl       Very High       >189 mg/dl         This screening LDL is a calculated result.    It does not have the accuracy of the Direct Measured LDL in the monitoring of patients with hyperlipidemia and/or statin therapy. Direct Measure LDL ([id number]) must be ordered separately in these patients. Non-HDL-Chol (CHOL-HDL) 11/01/2023 152  mg/dl Final    Hepatitis C Ab 11/01/2023 Non-reactive  Non-Reactive Final    PSA 11/01/2023 3.16  0.00 - 4.00 ng/mL Final    [employer] Access chemiluminescent immunoassay. Confirm baseline values for patients being serially monitored. Please note: This report has been generated by a voice recognition software system. Therefore there may be syntax, spelling, and/or grammatical errors. Please call if you have any questions.

## 2024-01-02 ENCOUNTER — TELEPHONE (OUTPATIENT)
Dept: HEMATOLOGY ONCOLOGY | Facility: CLINIC | Age: 69
End: 2024-01-02

## 2024-01-02 NOTE — TELEPHONE ENCOUNTER
Phoned patient with Rowan Tineo PA-C recommendations to hold xeralto 2 days prior to tooth extraction.  Patient aware and agreeable.

## 2024-02-02 DIAGNOSIS — Z86.711 HISTORY OF PULMONARY EMBOLISM: ICD-10-CM

## 2024-02-02 DIAGNOSIS — I82.403 RECURRENT ACUTE DEEP VEIN THROMBOSIS (DVT) OF BOTH LOWER EXTREMITIES (HCC): ICD-10-CM

## 2024-02-02 NOTE — TELEPHONE ENCOUNTER
Per patient, he is to have Xarelto sent to [facility] due to prescription portion of insurance change.

## 2024-07-08 ENCOUNTER — OFFICE VISIT (OUTPATIENT)
Dept: FAMILY MEDICINE CLINIC | Facility: CLINIC | Age: 69
End: 2024-07-08
Payer: MEDICARE

## 2024-07-08 VITALS
TEMPERATURE: 97.4 F | HEIGHT: 69 IN | WEIGHT: 213 LBS | RESPIRATION RATE: 18 BRPM | OXYGEN SATURATION: 98 % | SYSTOLIC BLOOD PRESSURE: 100 MMHG | HEART RATE: 58 BPM | BODY MASS INDEX: 31.55 KG/M2 | DIASTOLIC BLOOD PRESSURE: 60 MMHG

## 2024-07-08 DIAGNOSIS — Z00.00 MEDICARE ANNUAL WELLNESS VISIT, SUBSEQUENT: Primary | ICD-10-CM

## 2024-07-08 DIAGNOSIS — Z12.5 PROSTATE CANCER SCREENING: ICD-10-CM

## 2024-07-08 DIAGNOSIS — I26.99 ACUTE PULMONARY EMBOLISM WITHOUT ACUTE COR PULMONALE, UNSPECIFIED PULMONARY EMBOLISM TYPE (HCC): ICD-10-CM

## 2024-07-08 DIAGNOSIS — E78.2 MIXED HYPERLIPIDEMIA: ICD-10-CM

## 2024-07-08 PROCEDURE — G0439 PPPS, SUBSEQ VISIT: HCPCS | Performed by: INTERNAL MEDICINE

## 2024-07-08 NOTE — PROGRESS NOTES
Ambulatory Visit  Name: Kg Conde      : 1955      MRN: [id number]  Encounter Provider: Pepper Machado MD  Encounter Date: 2024   Encounter department: [facility]    Assessment & Plan   1. Medicare annual wellness visit, subsequent  2. Mixed hyperlipidemia  -     CBC; Future  -     Comprehensive metabolic panel; Future  -     Lipid panel; Future  3. Prostate cancer screening  -     PSA, Total Screen; Future  4. Acute pulmonary embolism without acute cor pulmonale, unspecified pulmonary embolism type (HCC)       Preventive health issues were discussed with patient, and age appropriate screening tests were ordered as noted in patient's After Visit Summary. Personalized health advice and appropriate referrals for health education or preventive services given if needed, as noted in patient's After Visit Summary.    History of Present Illness     HPI   Patient Care Team:  Pepper Machado MD as PCP - General (Internal Medicine)    Review of Systems   Constitutional:  Negative for chills and fever.   HENT:  Negative for ear pain and sore throat.    Eyes:  Negative for pain and visual disturbance.   Respiratory:  Negative for cough and shortness of breath.    Cardiovascular:  Negative for chest pain and palpitations.   Gastrointestinal:  Negative for abdominal pain and vomiting.   Genitourinary:  Negative for dysuria and hematuria.   Musculoskeletal:  Negative for arthralgias and back pain.   Skin:  Negative for color change and rash.   Neurological:  Negative for seizures and syncope.   All other systems reviewed and are negative.    Medical History Reviewed by provider this encounter:       Annual Wellness Visit Questionnaire   Kg is here for his Subsequent Wellness visit.     Health Risk Assessment:   Patient rates overall health as good. Patient feels that their physical health rating is same. Patient is satisfied with their life. Eyesight was rated as same. Hearing was rated as same.  Patient feels that their emotional and mental health rating is same. Patients states they are never, rarely angry. Patient states they are never, rarely unusually tired/fatigued. Pain experienced in the last 7 days has been none. Patient states that he has experienced no weight loss or gain in last 6 months.     Depression Screening:   PHQ-2 Score: 0      Fall Risk Screening:   In the past year, patient has experienced: no history of falling in past year      Home Safety:  Patient does not have trouble with stairs inside or outside of their home. Patient has working smoke alarms and has working carbon monoxide detector.     Nutrition:   Current diet is Regular.     Medications:   Patient is not currently taking any over-the-counter supplements. Patient is able to manage medications.     Activities of Daily Living (ADLs)/Instrumental Activities of Daily Living (IADLs):   Walk and transfer into and out of bed and chair?: Yes  Dress and groom yourself?: Yes    Bathe or shower yourself?: Yes    Feed yourself? Yes  Do your laundry/housekeeping?: Yes  Manage your money, pay your bills and track your expenses?: Yes  Make your own meals?: Yes    Do your own shopping?: Yes    Previous Hospitalizations:   Any hospitalizations or ED visits within the last 12 months?: No      Advance Care Planning:   Living will: No    Durable POA for healthcare: No    Advanced directive: No      Comments: Declines information.    Cognitive Screening:   Provider or family/friend/caregiver concerned regarding cognition?: No    PREVENTIVE SCREENINGS      Cardiovascular Screening:    General: Screening Not Indicated and History Lipid Disorder      Diabetes Screening:     General: Screening Current      Colorectal Cancer Screening:     General: Screening Current      Prostate Cancer Screening:    General: Screening Current      Osteoporosis Screening:    General: Screening Not Indicated      Abdominal Aortic Aneurysm (AAA) Screening:    Risk  "factors include: age between 65-74 yo        Lung Cancer Screening:     General: Screening Not Indicated      Hepatitis C Screening:    General: Screening Current    Screening, Brief Intervention, and Referral to Treatment (SBIRT)    Screening  Typical number of drinks in a day: 0  Typical number of drinks in a week: 0  Interpretation: Low risk drinking behavior.    Single Item Drug Screening:  How often have you used an illegal drug (including marijuana) or a prescription medication for non-medical reasons in the past year? never    Single Item Drug Screen Score: 0  Interpretation: Negative screen for possible drug use disorder    Brief Intervention  Alcohol & drug use screenings were reviewed. No concerns regarding substance use disorder identified.     Other Counseling Topics:   Car/seat belt/driving safety, skin self-exam, sunscreen and calcium and vitamin D intake and regular weightbearing exercise.     Social Determinants of Health     Financial Resource Strain: Patient Declined (7/6/2023)    Overall Financial Resource Strain (CARDIA)    • Difficulty of Paying Living Expenses: Patient declined   Transportation Needs: Patient Declined (7/6/2023)    PRAPARE - Transportation    • Lack of Transportation (Medical): Patient declined    • Lack of Transportation (Non-Medical): Patient declined     No results found.    Objective     /60   Pulse 58   Temp (!) 97.4 °F (36.3 °C)   Resp 18   Ht 5' 9\" (1.753 m)   Wt 96.6 kg (213 lb)   SpO2 98%   BMI 31.45 kg/m²     Physical Exam  Vitals and nursing note reviewed.   Constitutional:       General: He is not in acute distress.     Appearance: He is well-developed.   HENT:      Head: Normocephalic and atraumatic.   Eyes:      Conjunctiva/sclera: Conjunctivae normal.   Cardiovascular:      Rate and Rhythm: Normal rate and regular rhythm.      Heart sounds: No murmur heard.  Pulmonary:      Effort: Pulmonary effort is normal. No respiratory distress.      Breath " sounds: Normal breath sounds.   Abdominal:      Palpations: Abdomen is soft.      Tenderness: There is no abdominal tenderness.   Musculoskeletal:         General: No swelling.      Cervical back: Neck supple.   Skin:     General: Skin is warm and dry.      Capillary Refill: Capillary refill takes less than 2 seconds.   Neurological:      Mental Status: He is alert.   Psychiatric:         Mood and Affect: Mood normal.       Administrative Statements

## 2024-07-08 NOTE — PATIENT INSTRUCTIONS
Medicare Preventive Visit Patient Instructions  Thank you for completing your Welcome to Medicare Visit or Medicare Annual Wellness Visit today. Your next wellness visit will be due in one year (7/9/2025).  The screening/preventive services that you may require over the next 5-10 years are detailed below. Some tests may not apply to you based off risk factors and/or age. Screening tests ordered at today's visit but not completed yet may show as past due. Also, please note that scanned in results may not display below.  Preventive Screenings:  Service Recommendations Previous Testing/Comments   Colorectal Cancer Screening  Colonoscopy    Fecal Occult Blood Test (FOBT)/Fecal Immunochemical Test (FIT)  Fecal DNA/Cologuard Test  Flexible Sigmoidoscopy Age: 45-75 years old   Colonoscopy: every 10 years (May be performed more frequently if at higher risk)  OR  FOBT/FIT: every 1 year  OR  Cologuard: every 3 years  OR  Sigmoidoscopy: every 5 years  Screening may be recommended earlier than age 45 if at higher risk for colorectal cancer. Also, an individualized decision between you and your healthcare provider will decide whether screening between the ages of 76-85 would be appropriate. Colonoscopy: 12/09/2022  FOBT/FIT: Not on file  Cologuard: Not on file  Sigmoidoscopy: Not on file    Screening Current     Prostate Cancer Screening Individualized decision between patient and health care provider in men between ages of 55-69   Medicare will cover every 12 months beginning on the day after your 50th birthday PSA: 3.16 ng/mL     Screening Current     Hepatitis C Screening Once for adults born between 1945 and 1965  More frequently in patients at high risk for Hepatitis C Hep C Antibody: 11/01/2023    Screening Current   Diabetes Screening 1-2 times per year if you're at risk for diabetes or have pre-diabetes Fasting glucose: 88 mg/dL (11/1/2023)  A1C: No results in last 5 years (No results in last 5 years)  Screening Current    Cholesterol Screening Once every 5 years if you don't have a lipid disorder. May order more often based on risk factors. Lipid panel: 11/01/2023  Screening Not Indicated  History Lipid Disorder      Other Preventive Screenings Covered by Medicare:  Abdominal Aortic Aneurysm (AAA) Screening: covered once if your at risk. You're considered to be at risk if you have a family history of AAA or a male between the age of 65-75 who smoking at least 100 cigarettes in your lifetime.  Lung Cancer Screening: covers low dose CT scan once per year if you meet all of the following conditions: (1) Age 55-77; (2) No signs or symptoms of lung cancer; (3) Current smoker or have quit smoking within the last 15 years; (4) You have a tobacco smoking history of at least 20 pack years (packs per day x number of years you smoked); (5) You get a written order from a healthcare provider.  Glaucoma Screening: covered annually if you're considered high risk: (1) You have diabetes OR (2) Family history of glaucoma OR (3)  aged 50 and older OR (4)  American aged 65 and older  Osteoporosis Screening: covered every 2 years if you meet one of the following conditions: (1) Have a vertebral abnormality; (2) On glucocorticoid therapy for more than 3 months; (3) Have primary hyperparathyroidism; (4) On osteoporosis medications and need to assess response to drug therapy.  HIV Screening: covered annually if you're between the age of 15-65. Also covered annually if you are younger than 15 and older than 65 with risk factors for HIV infection. For pregnant patients, it is covered up to 3 times per pregnancy.    Immunizations:  Immunization Recommendations   Influenza Vaccine Annual influenza vaccination during flu season is recommended for all persons aged >= 6 months who do not have contraindications   Pneumococcal Vaccine   * Pneumococcal conjugate vaccine = PCV13 (Prevnar 13), PCV15 (Vaxneuvance), PCV20 (Prevnar 20)  *  Pneumococcal polysaccharide vaccine = PPSV23 (Pneumovax) Adults 19-63 yo with certain risk factors or if 65+ yo  If never received any pneumonia vaccine: recommend Prevnar 20 (PCV20)  Give PCV20 if previously received 1 dose of PCV13 or PPSV23   Hepatitis B Vaccine 3 dose series if at intermediate or high risk (ex: diabetes, end stage renal disease, liver disease)   Respiratory syncytial virus (RSV) Vaccine - COVERED BY MEDICARE PART D  * RSVPreF3 (Arexvy) CDC recommends that adults 60 years of age and older may receive a single dose of RSV vaccine using shared clinical decision-making (SCDM)   Tetanus (Td) Vaccine - COST NOT COVERED BY MEDICARE PART B Following completion of primary series, a booster dose should be given every 10 years to maintain immunity against tetanus. Td may also be given as tetanus wound prophylaxis.   Tdap Vaccine - COST NOT COVERED BY MEDICARE PART B Recommended at least once for all adults. For pregnant patients, recommended with each pregnancy.   Shingles Vaccine (Shingrix) - COST NOT COVERED BY MEDICARE PART B  2 shot series recommended in those 19 years and older who have or will have weakened immune systems or those 50 years and older     Health Maintenance Due:      Topic Date Due   • Colorectal Cancer Screening  12/08/2027   • Hepatitis C Screening  Completed     Immunizations Due:      Topic Date Due   • COVID-19 Vaccine (1 - 2023-24 season) Never done   • Influenza Vaccine (1) 09/01/2024     Advance Directives   What are advance directives?  Advance directives are legal documents that state your wishes and plans for medical care. These plans are made ahead of time in case you lose your ability to make decisions for yourself. Advance directives can apply to any medical decision, such as the treatments you want, and if you want to donate organs.   What are the types of advance directives?  There are many types of advance directives, and each state has rules about how to use them. You  may choose a combination of any of the following:  Living will:  This is a written record of the treatment you want. You can also choose which treatments you do not want, which to limit, and which to stop at a certain time. This includes surgery, medicine, IV fluid, and tube feedings.   Durable power of  for healthcare (DPAHC):  This is a written record that states who you want to make healthcare choices for you when you are unable to make them for yourself. This person, called a proxy, is usually a family member or a friend. You may choose more than 1 proxy.  Do not resuscitate (DNR) order:  A DNR order is used in case your heart stops beating or you stop breathing. It is a request not to have certain forms of treatment, such as CPR. A DNR order may be included in other types of advance directives.  Medical directive:  This covers the care that you want if you are in a coma, near death, or unable to make decisions for yourself. You can list the treatments you want for each condition. Treatment may include pain medicine, surgery, blood transfusions, dialysis, IV or tube feedings, and a ventilator (breathing machine).  Values history:  This document has questions about your views, beliefs, and how you feel and think about life. This information can help others choose the care that you would choose.  Why are advance directives important?  An advance directive helps you control your care. Although spoken wishes may be used, it is better to have your wishes written down. Spoken wishes can be misunderstood, or not followed. Treatments may be given even if you do not want them. An advance directive may make it easier for your family to make difficult choices about your care.   Weight Management   Why it is important to manage your weight:  Being overweight increases your risk of health conditions such as heart disease, high blood pressure, type 2 diabetes, and certain types of cancer. It can also increase your  risk for osteoarthritis, sleep apnea, and other respiratory problems. Aim for a slow, steady weight loss. Even a small amount of weight loss can lower your risk of health problems.  How to lose weight safely:  A safe and healthy way to lose weight is to eat fewer calories and get regular exercise. You can lose up about 1 pound a week by decreasing the number of calories you eat by 500 calories each day.   Healthy meal plan for weight management:  A healthy meal plan includes a variety of foods, contains fewer calories, and helps you stay healthy. A healthy meal plan includes the following:  Eat whole-grain foods more often.  A healthy meal plan should contain fiber. Fiber is the part of grains, fruits, and vegetables that is not broken down by your body. Whole-grain foods are healthy and provide extra fiber in your diet. Some examples of whole-grain foods are whole-wheat breads and pastas, oatmeal, brown rice, and bulgur.  Eat a variety of vegetables every day.  Include dark, leafy greens such as spinach, kale, moe greens, and mustard greens. Eat yellow and orange vegetables such as carrots, sweet potatoes, and winter squash.   Eat a variety of fruits every day.  Choose fresh or canned fruit (canned in its own juice or light syrup) instead of juice. Fruit juice has very little or no fiber.  Eat low-fat dairy foods.  Drink fat-free (skim) milk or 1% milk. Eat fat-free yogurt and low-fat cottage cheese. Try low-fat cheeses such as mozzarella and other reduced-fat cheeses.  Choose meat and other protein foods that are low in fat.  Choose beans or other legumes such as split peas or lentils. Choose fish, skinless poultry (chicken or turkey), or lean cuts of red meat (beef or pork). Before you cook meat or poultry, cut off any visible fat.   Use less fat and oil.  Try baking foods instead of frying them. Add less fat, such as margarine, sour cream, regular salad dressing and mayonnaise to foods. Eat fewer high-fat  foods. Some examples of high-fat foods include french fries, doughnuts, ice cream, and cakes.  Eat fewer sweets.  Limit foods and drinks that are high in sugar. This includes candy, cookies, regular soda, and sweetened drinks.  Exercise:  Exercise at least 30 minutes per day on most days of the week. Some examples of exercise include walking, biking, dancing, and swimming. You can also fit in more physical activity by taking the stairs instead of the elevator or parking farther away from stores. Ask your healthcare provider about the best exercise plan for you.      © Copyright [employer] 2018 Information is for End User's use only and may not be sold, redistributed or otherwise used for commercial purposes. All illustrations and images included in CareNotes® are the copyrighted property of [employer] or [employer]

## 2024-11-04 ENCOUNTER — APPOINTMENT (OUTPATIENT)
Dept: LAB | Facility: CLINIC | Age: 69
End: 2024-11-04
Payer: MEDICARE

## 2024-11-04 DIAGNOSIS — E78.2 MIXED HYPERLIPIDEMIA: ICD-10-CM

## 2024-11-04 DIAGNOSIS — Z12.5 PROSTATE CANCER SCREENING: ICD-10-CM

## 2024-11-04 LAB
ALBUMIN SERPL BCG-MCNC: 4 G/DL (ref 3.5–5)
ALP SERPL-CCNC: 55 U/L (ref 34–104)
ALT SERPL W P-5'-P-CCNC: 14 U/L (ref 7–52)
ANION GAP SERPL CALCULATED.3IONS-SCNC: 4 MMOL/L (ref 4–13)
AST SERPL W P-5'-P-CCNC: 19 U/L (ref 5–45)
BILIRUB SERPL-MCNC: 0.57 MG/DL (ref 0.2–1)
BUN SERPL-MCNC: 14 MG/DL (ref 5–25)
CALCIUM SERPL-MCNC: 8.7 MG/DL (ref 8.4–10.2)
CHLORIDE SERPL-SCNC: 107 MMOL/L (ref 96–108)
CHOLEST SERPL-MCNC: 206 MG/DL
CO2 SERPL-SCNC: 30 MMOL/L (ref 21–32)
CREAT SERPL-MCNC: 0.88 MG/DL (ref 0.6–1.3)
ERYTHROCYTE [DISTWIDTH] IN BLOOD BY AUTOMATED COUNT: 12.9 % (ref 11.6–15.1)
GLUCOSE P FAST SERPL-MCNC: 84 MG/DL (ref 65–99)
HCT VFR BLD AUTO: 44.9 % (ref 36.5–46.1)
HDLC SERPL-MCNC: 53 MG/DL
HGB BLD-MCNC: 15 G/DL (ref 12–15.4)
LDLC SERPL CALC-MCNC: 141 MG/DL (ref 0–100)
MCH RBC QN AUTO: 31.8 PG (ref 26.8–34.3)
MCHC RBC AUTO-ENTMCNC: 33.4 G/DL (ref 31.4–37.4)
MCV RBC AUTO: 95 FL (ref 82–98)
NONHDLC SERPL-MCNC: 153 MG/DL
PLATELET # BLD AUTO: 191 THOUSANDS/UL (ref 149–390)
PMV BLD AUTO: 9.3 FL (ref 8.9–12.7)
POTASSIUM SERPL-SCNC: 4 MMOL/L (ref 3.5–5.3)
PROT SERPL-MCNC: 6.8 G/DL (ref 6.4–8.4)
PSA SERPL-MCNC: 3.39 NG/ML (ref 0–4)
RBC # BLD AUTO: 4.72 MILLION/UL (ref 3.88–5.12)
SODIUM SERPL-SCNC: 141 MMOL/L (ref 135–147)
TRIGL SERPL-MCNC: 61 MG/DL
WBC # BLD AUTO: 4.36 THOUSAND/UL (ref 4.31–10.16)

## 2024-11-04 PROCEDURE — 85027 COMPLETE CBC AUTOMATED: CPT

## 2024-11-04 PROCEDURE — G0103 PSA SCREENING: HCPCS

## 2024-11-04 PROCEDURE — 80061 LIPID PANEL: CPT

## 2024-11-04 PROCEDURE — 36415 COLL VENOUS BLD VENIPUNCTURE: CPT

## 2024-11-04 PROCEDURE — 80053 COMPREHEN METABOLIC PANEL: CPT

## 2024-11-24 DIAGNOSIS — Z86.711 HISTORY OF PULMONARY EMBOLISM: ICD-10-CM

## 2024-11-24 DIAGNOSIS — I82.403 RECURRENT ACUTE DEEP VEIN THROMBOSIS (DVT) OF BOTH LOWER EXTREMITIES (HCC): ICD-10-CM

## 2024-11-25 RX ORDER — RIVAROXABAN 20 MG/1
20 TABLET, FILM COATED ORAL
Qty: 90 TABLET | Refills: 3 | Status: SHIPPED | OUTPATIENT
Start: 2024-11-25

## 2025-03-13 ENCOUNTER — PATIENT MESSAGE (OUTPATIENT)
Dept: FAMILY MEDICINE CLINIC | Facility: CLINIC | Age: 70
End: 2025-03-13

## 2025-03-13 DIAGNOSIS — I82.403 RECURRENT ACUTE DEEP VEIN THROMBOSIS (DVT) OF BOTH LOWER EXTREMITIES (HCC): ICD-10-CM

## 2025-03-13 DIAGNOSIS — Z86.711 HISTORY OF PULMONARY EMBOLISM: ICD-10-CM

## 2025-07-09 ENCOUNTER — OFFICE VISIT (OUTPATIENT)
Dept: FAMILY MEDICINE CLINIC | Facility: CLINIC | Age: 70
End: 2025-07-09
Payer: MEDICARE

## 2025-07-09 VITALS
HEART RATE: 59 BPM | HEIGHT: 69 IN | BODY MASS INDEX: 29.92 KG/M2 | DIASTOLIC BLOOD PRESSURE: 60 MMHG | OXYGEN SATURATION: 98 % | SYSTOLIC BLOOD PRESSURE: 106 MMHG | WEIGHT: 202 LBS | RESPIRATION RATE: 18 BRPM | TEMPERATURE: 98.4 F

## 2025-07-09 DIAGNOSIS — Z12.5 SCREENING PSA (PROSTATE SPECIFIC ANTIGEN): Primary | ICD-10-CM

## 2025-07-09 DIAGNOSIS — Z00.00 MEDICARE ANNUAL WELLNESS VISIT, SUBSEQUENT: ICD-10-CM

## 2025-07-09 DIAGNOSIS — N40.1 BENIGN PROSTATIC HYPERPLASIA WITH LOWER URINARY TRACT SYMPTOMS, SYMPTOM DETAILS UNSPECIFIED: ICD-10-CM

## 2025-07-09 DIAGNOSIS — E78.2 MIXED HYPERLIPIDEMIA: ICD-10-CM

## 2025-07-09 PROCEDURE — G0439 PPPS, SUBSEQ VISIT: HCPCS | Performed by: INTERNAL MEDICINE

## 2025-07-09 NOTE — PATIENT INSTRUCTIONS
Medicare Preventive Visit Patient Instructions  Thank you for completing your Welcome to Medicare Visit or Medicare Annual Wellness Visit today. Your next wellness visit will be due in one year (7/10/2026).  The screening/preventive services that you may require over the next 5-10 years are detailed below. Some tests may not apply to you based off risk factors and/or age. Screening tests ordered at today's visit but not completed yet may show as past due. Also, please note that scanned in results may not display below.  Preventive Screenings:  Service Recommendations Previous Testing/Comments   Colorectal Cancer Screening  Colonoscopy    Fecal Occult Blood Test (FOBT)/Fecal Immunochemical Test (FIT)  Fecal DNA/Cologuard Test  Flexible Sigmoidoscopy Age: 45-75 years old   Colonoscopy: every 10 years (May be performed more frequently if at higher risk)  OR  FOBT/FIT: every 1 year  OR  Cologuard: every 3 years  OR  Sigmoidoscopy: every 5 years  Screening may be recommended earlier than age 45 if at higher risk for colorectal cancer. Also, an individualized decision between you and your healthcare provider will decide whether screening between the ages of 76-85 would be appropriate. Colonoscopy: 12/09/2022  FOBT/FIT: Not on file  Cologuard: Not on file  Sigmoidoscopy: Not on file    Screening Current     Prostate Cancer Screening Individualized decision between patient and health care provider in men between ages of 55-69   Medicare will cover every 12 months beginning on the day after your 50th birthday PSA: 3.389 ng/mL     Screening Current     Hepatitis C Screening Once for adults born between 1945 and 1965  More frequently in patients at high risk for Hepatitis C Hep C Antibody: 11/01/2023    Screening Current   Diabetes Screening 1-2 times per year if you're at risk for diabetes or have pre-diabetes Fasting glucose: 84 mg/dL (11/4/2024)  A1C: No results in last 5 years (No results in last 5 years)  Screening  Current   Cholesterol Screening Once every 5 years if you don't have a lipid disorder. May order more often based on risk factors. Lipid panel: 11/04/2024  Screening Not Indicated  History Lipid Disorder      Other Preventive Screenings Covered by Medicare:  Abdominal Aortic Aneurysm (AAA) Screening: covered once if your at risk. You're considered to be at risk if you have a family history of AAA or a male between the age of 65-75 who smoking at least 100 cigarettes in your lifetime.  Lung Cancer Screening: covers low dose CT scan once per year if you meet all of the following conditions: (1) Age 55-77; (2) No signs or symptoms of lung cancer; (3) Current smoker or have quit smoking within the last 15 years; (4) You have a tobacco smoking history of at least 20 pack years (packs per day x number of years you smoked); (5) You get a written order from a healthcare provider.  Glaucoma Screening: covered annually if you're considered high risk: (1) You have diabetes OR (2) Family history of glaucoma OR (3)  aged 50 and older OR (4)  American aged 65 and older  Osteoporosis Screening: covered every 2 years if you meet one of the following conditions: (1) Have a vertebral abnormality; (2) On glucocorticoid therapy for more than 3 months; (3) Have primary hyperparathyroidism; (4) On osteoporosis medications and need to assess response to drug therapy.  HIV Screening: covered annually if you're between the age of 15-65. Also covered annually if you are younger than 15 and older than 65 with risk factors for HIV infection. For pregnant patients, it is covered up to 3 times per pregnancy.    Immunizations:  Immunization Recommendations   Influenza Vaccine Annual influenza vaccination during flu season is recommended for all persons aged >= 6 months who do not have contraindications   Pneumococcal Vaccine   * Pneumococcal conjugate vaccine = PCV13 (Prevnar 13), PCV15 (Vaxneuvance), PCV20 (Prevnar 20)  *  Pneumococcal polysaccharide vaccine = PPSV23 (Pneumovax) Adults 19-63 yo with certain risk factors or if 65+ yo  If never received any pneumonia vaccine: recommend Prevnar 20 (PCV20)  Give PCV20 if previously received 1 dose of PCV13 or PPSV23   Hepatitis B Vaccine 3 dose series if at intermediate or high risk (ex: diabetes, end stage renal disease, liver disease)   Respiratory syncytial virus (RSV) Vaccine - COVERED BY MEDICARE PART D  * RSVPreF3 (Arexvy) CDC recommends that adults 60 years of age and older may receive a single dose of RSV vaccine using shared clinical decision-making (SCDM)   Tetanus (Td) Vaccine - COST NOT COVERED BY MEDICARE PART B Following completion of primary series, a booster dose should be given every 10 years to maintain immunity against tetanus. Td may also be given as tetanus wound prophylaxis.   Tdap Vaccine - COST NOT COVERED BY MEDICARE PART B Recommended at least once for all adults. For pregnant patients, recommended with each pregnancy.   Shingles Vaccine (Shingrix) - COST NOT COVERED BY MEDICARE PART B  2 shot series recommended in those 19 years and older who have or will have weakened immune systems or those 50 years and older     Health Maintenance Due:      Topic Date Due   • Colorectal Cancer Screening  12/08/2027   • Hepatitis C Screening  Completed     Immunizations Due:      Topic Date Due   • COVID-19 Vaccine (1 - 2024-25 season) Never done   • Influenza Vaccine (1) 09/01/2025     Advance Directives   What are advance directives?  Advance directives are legal documents that state your wishes and plans for medical care. These plans are made ahead of time in case you lose your ability to make decisions for yourself. Advance directives can apply to any medical decision, such as the treatments you want, and if you want to donate organs.   What are the types of advance directives?  There are many types of advance directives, and each state has rules about how to use them. You  may choose a combination of any of the following:  Living will:  This is a written record of the treatment you want. You can also choose which treatments you do not want, which to limit, and which to stop at a certain time. This includes surgery, medicine, IV fluid, and tube feedings.   Durable power of  for healthcare (DPAHC):  This is a written record that states who you want to make healthcare choices for you when you are unable to make them for yourself. This person, called a proxy, is usually a family member or a friend. You may choose more than 1 proxy.  Do not resuscitate (DNR) order:  A DNR order is used in case your heart stops beating or you stop breathing. It is a request not to have certain forms of treatment, such as CPR. A DNR order may be included in other types of advance directives.  Medical directive:  This covers the care that you want if you are in a coma, near death, or unable to make decisions for yourself. You can list the treatments you want for each condition. Treatment may include pain medicine, surgery, blood transfusions, dialysis, IV or tube feedings, and a ventilator (breathing machine).  Values history:  This document has questions about your views, beliefs, and how you feel and think about life. This information can help others choose the care that you would choose.  Why are advance directives important?  An advance directive helps you control your care. Although spoken wishes may be used, it is better to have your wishes written down. Spoken wishes can be misunderstood, or not followed. Treatments may be given even if you do not want them. An advance directive may make it easier for your family to make difficult choices about your care.   Weight Management   Why it is important to manage your weight:  Being overweight increases your risk of health conditions such as heart disease, high blood pressure, type 2 diabetes, and certain types of cancer. It can also increase your  risk for osteoarthritis, sleep apnea, and other respiratory problems. Aim for a slow, steady weight loss. Even a small amount of weight loss can lower your risk of health problems.  How to lose weight safely:  A safe and healthy way to lose weight is to eat fewer calories and get regular exercise. You can lose up about 1 pound a week by decreasing the number of calories you eat by 500 calories each day.   Healthy meal plan for weight management:  A healthy meal plan includes a variety of foods, contains fewer calories, and helps you stay healthy. A healthy meal plan includes the following:  Eat whole-grain foods more often.  A healthy meal plan should contain fiber. Fiber is the part of grains, fruits, and vegetables that is not broken down by your body. Whole-grain foods are healthy and provide extra fiber in your diet. Some examples of whole-grain foods are whole-wheat breads and pastas, oatmeal, brown rice, and bulgur.  Eat a variety of vegetables every day.  Include dark, leafy greens such as spinach, kale, moe greens, and mustard greens. Eat yellow and orange vegetables such as carrots, sweet potatoes, and winter squash.   Eat a variety of fruits every day.  Choose fresh or canned fruit (canned in its own juice or light syrup) instead of juice. Fruit juice has very little or no fiber.  Eat low-fat dairy foods.  Drink fat-free (skim) milk or 1% milk. Eat fat-free yogurt and low-fat cottage cheese. Try low-fat cheeses such as mozzarella and other reduced-fat cheeses.  Choose meat and other protein foods that are low in fat.  Choose beans or other legumes such as split peas or lentils. Choose fish, skinless poultry (chicken or turkey), or lean cuts of red meat (beef or pork). Before you cook meat or poultry, cut off any visible fat.   Use less fat and oil.  Try baking foods instead of frying them. Add less fat, such as margarine, sour cream, regular salad dressing and mayonnaise to foods. Eat fewer high-fat  foods. Some examples of high-fat foods include french fries, doughnuts, ice cream, and cakes.  Eat fewer sweets.  Limit foods and drinks that are high in sugar. This includes candy, cookies, regular soda, and sweetened drinks.  Exercise:  Exercise at least 30 minutes per day on most days of the week. Some examples of exercise include walking, biking, dancing, and swimming. You can also fit in more physical activity by taking the stairs instead of the elevator or parking farther away from stores. Ask your healthcare provider about the best exercise plan for you.    © Copyright [employer] 2018 Information is for End User's use only and may not be sold, redistributed or otherwise used for commercial purposes. All illustrations and images included in CareNotes® are the copyrighted property of [employer]., Inc. or "[employer]

## 2025-07-09 NOTE — PROGRESS NOTES
Name: Kg Conde      : 1955      MRN: [id number]  Encounter Provider: Pepper Machado MD  Encounter Date: 2025   Encounter department: [facility]  :  Assessment & Plan  Screening PSA (prostate specific antigen)    Orders:  •  PSA Total (Reflex To Free); Future    Medicare annual wellness visit, subsequent         Mixed hyperlipidemia    Orders:  •  CBC; Future  •  Comprehensive metabolic panel; Future  •  Lipid panel; Future    Benign prostatic hyperplasia with lower urinary tract symptoms, symptom details unspecified    Orders:  •  PSA Total (Reflex To Free); Future       Preventive health issues were discussed with patient, and age appropriate screening tests were ordered as noted in patient's After Visit Summary. Personalized health advice and appropriate referrals for health education or preventive services given if needed, as noted in patient's After Visit Summary.    History of Present Illness     Here for AWV.       Patient Care Team:  Pepper Machado MD as PCP - General (Internal Medicine)    Review of Systems   Constitutional:  Negative for chills and fever.   HENT:  Negative for ear pain and sore throat.    Eyes:  Negative for pain and visual disturbance.   Respiratory:  Negative for cough and shortness of breath.    Cardiovascular:  Negative for chest pain and palpitations.   Gastrointestinal:  Negative for abdominal pain and vomiting.   Genitourinary:  Negative for dysuria and hematuria.   Musculoskeletal:  Negative for arthralgias and back pain.   Skin:  Negative for color change and rash.   Neurological:  Negative for seizures and syncope.   All other systems reviewed and are negative.    Medical History Reviewed by provider this encounter:       Annual Wellness Visit Questionnaire   Kg is here for his Subsequent Wellness visit.     Health Risk Assessment:   Patient rates overall health as good. Patient feels that their physical health rating is same. Patient is  satisfied with their life. Eyesight was rated as same. Hearing was rated as same. Patient feels that their emotional and mental health rating is same. Patients states they are never, rarely angry. Patient states they are never, rarely unusually tired/fatigued. Pain experienced in the last 7 days has been none. Patient states that he has experienced no weight loss or gain in last 6 months.     Depression Screening:   PHQ-2 Score: 0      Fall Risk Screening:   In the past year, patient has experienced: no history of falling in past year      Home Safety:  Patient does not have trouble with stairs inside or outside of their home. Patient has no working smoke alarms and has no working carbon monoxide detector. Home safety hazards include: none.     Nutrition:   Current diet is Regular.     Medications:   Patient is not currently taking any over-the-counter supplements. Patient is able to manage medications.     Activities of Daily Living (ADLs)/Instrumental Activities of Daily Living (IADLs):   Walk and transfer into and out of bed and chair?: Yes  Dress and groom yourself?: Yes    Bathe or shower yourself?: Yes    Feed yourself? Yes  Do your laundry/housekeeping?: Yes  Manage your money, pay your bills and track your expenses?: Yes  Make your own meals?: Yes    Do your own shopping?: Yes    Previous Hospitalizations:   Any hospitalizations or ED visits within the last 12 months?: No      Advance Care Planning:   Living will: No    Durable POA for healthcare: No    Advanced directive: No      Comments: Declines information.     Cognitive Screening:   Provider or family/friend/caregiver concerned regarding cognition?: No    Preventive Screenings      Cardiovascular Screening:    General: Screening Not Indicated and History Lipid Disorder      Diabetes Screening:     General: Screening Current      Colorectal Cancer Screening:     General: Screening Current      Prostate Cancer Screening:    General: Screening Current and  Risks and Benefits Discussed    Due for: PSA      Osteoporosis Screening:    General: Screening Not Indicated      Abdominal Aortic Aneurysm (AAA) Screening:    Risk factors include: age between 65-76 yo        General: Screening Not Indicated      Lung Cancer Screening:     General: Screening Not Indicated      Hepatitis C Screening:    General: Screening Current    Immunizations:  - Immunizations due: Up to date    Screening, Brief Intervention, and Referral to Treatment (SBIRT)     Screening  Typical number of drinks in a day: 0  Typical number of drinks in a week: 0  Interpretation: Low risk drinking behavior.    AUDIT-C Screenin) How often did you have a drink containing alcohol in the past year? never  2) How many drinks did you have on a typical day when you were drinking in the past year? 0  3) How often did you have 6 or more drinks on one occasion in the past year? never    AUDIT-C Score: 0  Interpretation: Score 0-3 (male): Negative screen for alcohol misuse    Single Item Drug Screening:  How often have you used an illegal drug (including marijuana) or a prescription medication for non-medical reasons in the past year? never    Single Item Drug Screen Score: 0  Interpretation: Negative screen for possible drug use disorder    Brief Intervention  Alcohol & drug use screenings were reviewed. No concerns regarding substance use disorder identified.     Other Counseling Topics:   Car/seat belt/driving safety, skin self-exam, sunscreen and regular weightbearing exercise.     Social Drivers of Health     Financial Resource Strain: Patient Declined (2023)    Overall Financial Resource Strain (CARDIA)    • Difficulty of Paying Living Expenses: Patient declined   Food Insecurity: No Food Insecurity (2025)    Nursing - Inadequate Food Risk Classification    • Worried About Running Out of Food in the Last Year: Never true    • Ran Out of Food in the Last Year: Never true   Transportation Needs: No  "Transportation Needs (7/9/2025)    PRAPARE - Transportation    • Lack of Transportation (Medical): No    • Lack of Transportation (Non-Medical): No   Housing Stability: Low Risk  (7/9/2025)    Housing Stability Vital Sign    • Unable to Pay for Housing in the Last Year: No    • Number of Times Moved in the Last Year: 0    • Homeless in the Last Year: No   Utilities: Not At Risk (7/9/2025)    [facility] Utilities    • Threatened with loss of utilities: No     No results found.    Objective   /60   Pulse 59   Temp 98.4 °F (36.9 °C)   Resp 18   Ht 5' 9\" (1.753 m)   Wt 91.6 kg (202 lb)   SpO2 98%   BMI 29.83 kg/m²     Physical Exam  Vitals and nursing note reviewed.   Constitutional:       General: He is not in acute distress.     Appearance: He is well-developed.   HENT:      Head: Normocephalic and atraumatic.     Eyes:      Conjunctiva/sclera: Conjunctivae normal.       Cardiovascular:      Rate and Rhythm: Normal rate and regular rhythm.      Heart sounds: No murmur heard.  Pulmonary:      Effort: Pulmonary effort is normal. No respiratory distress.      Breath sounds: Normal breath sounds.   Abdominal:      Palpations: Abdomen is soft.      Tenderness: There is no abdominal tenderness.     Musculoskeletal:         General: No swelling.      Cervical back: Neck supple.     Skin:     General: Skin is warm and dry.      Capillary Refill: Capillary refill takes less than 2 seconds.     Neurological:      Mental Status: He is alert.     Psychiatric:         Mood and Affect: Mood normal.         "